# Patient Record
Sex: FEMALE | Race: WHITE | NOT HISPANIC OR LATINO | Employment: UNEMPLOYED | URBAN - METROPOLITAN AREA
[De-identification: names, ages, dates, MRNs, and addresses within clinical notes are randomized per-mention and may not be internally consistent; named-entity substitution may affect disease eponyms.]

---

## 2017-05-02 ENCOUNTER — ALLSCRIPTS OFFICE VISIT (OUTPATIENT)
Dept: OTHER | Facility: OTHER | Age: 67
End: 2017-05-02

## 2017-05-02 DIAGNOSIS — N18.30 CHRONIC KIDNEY DISEASE, STAGE III (MODERATE) (HCC): ICD-10-CM

## 2017-05-02 LAB
BILIRUB UR QL STRIP: 1
CLARITY UR: NORMAL
COLOR UR: YELLOW
GLUCOSE (HISTORICAL): NEGATIVE
HGB UR QL STRIP.AUTO: NEGATIVE
KETONES UR STRIP-MCNC: NEGATIVE MG/DL
LEUKOCYTE ESTERASE UR QL STRIP: NEGATIVE
NITRITE UR QL STRIP: NEGATIVE
PH UR STRIP.AUTO: 5 [PH]
PROT UR STRIP-MCNC: 1 MG/DL
SP GR UR STRIP.AUTO: 1.03
UROBILINOGEN UR QL STRIP.AUTO: 0.2

## 2017-05-09 ENCOUNTER — HOSPITAL ENCOUNTER (OUTPATIENT)
Dept: RADIOLOGY | Facility: HOSPITAL | Age: 67
Discharge: HOME | End: 2017-05-09
Attending: INTERNAL MEDICINE
Payer: COMMERCIAL

## 2017-05-09 DIAGNOSIS — N18.30 CHRONIC KIDNEY DISEASE, STAGE III (MODERATE) (HCC): ICD-10-CM

## 2017-05-09 PROCEDURE — 76770 US EXAM ABDO BACK WALL COMP: CPT

## 2017-06-05 DIAGNOSIS — N18.30 CHRONIC KIDNEY DISEASE, STAGE III (MODERATE) (HCC): ICD-10-CM

## 2017-06-05 DIAGNOSIS — M10.9 GOUT: ICD-10-CM

## 2017-06-05 DIAGNOSIS — R80.9 PROTEINURIA: ICD-10-CM

## 2017-08-08 ENCOUNTER — ALLSCRIPTS OFFICE VISIT (OUTPATIENT)
Dept: OTHER | Facility: OTHER | Age: 67
End: 2017-08-08

## 2017-08-08 DIAGNOSIS — N25.81 SECONDARY HYPERPARATHYROIDISM OF RENAL ORIGIN (HCC): ICD-10-CM

## 2017-08-16 ENCOUNTER — GENERIC CONVERSION - ENCOUNTER (OUTPATIENT)
Dept: OTHER | Facility: OTHER | Age: 67
End: 2017-08-16

## 2017-08-21 DIAGNOSIS — I10 ESSENTIAL (PRIMARY) HYPERTENSION: ICD-10-CM

## 2017-10-08 DIAGNOSIS — I10 ESSENTIAL (PRIMARY) HYPERTENSION: ICD-10-CM

## 2017-11-30 ENCOUNTER — GENERIC CONVERSION - ENCOUNTER (OUTPATIENT)
Dept: NEPHROLOGY | Facility: CLINIC | Age: 67
End: 2017-11-30

## 2017-11-30 ENCOUNTER — GENERIC CONVERSION - ENCOUNTER (OUTPATIENT)
Dept: OTHER | Facility: OTHER | Age: 67
End: 2017-11-30

## 2018-01-13 VITALS
DIASTOLIC BLOOD PRESSURE: 82 MMHG | WEIGHT: 242.13 LBS | SYSTOLIC BLOOD PRESSURE: 160 MMHG | HEIGHT: 65 IN | BODY MASS INDEX: 40.34 KG/M2 | HEART RATE: 76 BPM

## 2018-01-13 NOTE — MISCELLANEOUS
Message  Unable to reach patient regarding BP readings  BP has been 908G systolic at home with diastolic in 27J on average  Will try again to speak with her after bloodwork next week (already ordered and patient aware as ordered during last office visit)        Signatures   Electronically signed by : Papi Vela DO; Aug 16 2017  1:45PM EST                       (Author)

## 2018-01-15 VITALS
BODY MASS INDEX: 38.86 KG/M2 | HEART RATE: 69 BPM | WEIGHT: 233.25 LBS | SYSTOLIC BLOOD PRESSURE: 174 MMHG | HEIGHT: 65 IN | DIASTOLIC BLOOD PRESSURE: 72 MMHG

## 2018-01-15 DIAGNOSIS — N18.30 CHRONIC KIDNEY DISEASE, STAGE III (MODERATE) (HCC): ICD-10-CM

## 2018-01-22 VITALS — DIASTOLIC BLOOD PRESSURE: 80 MMHG | SYSTOLIC BLOOD PRESSURE: 160 MMHG

## 2018-01-22 VITALS
HEIGHT: 65 IN | WEIGHT: 230.38 LBS | DIASTOLIC BLOOD PRESSURE: 68 MMHG | BODY MASS INDEX: 38.38 KG/M2 | SYSTOLIC BLOOD PRESSURE: 140 MMHG

## 2018-03-28 ENCOUNTER — OFFICE VISIT (OUTPATIENT)
Dept: NEPHROLOGY | Facility: CLINIC | Age: 68
End: 2018-03-28
Payer: COMMERCIAL

## 2018-03-28 VITALS
SYSTOLIC BLOOD PRESSURE: 136 MMHG | HEIGHT: 65 IN | HEART RATE: 62 BPM | WEIGHT: 233.8 LBS | DIASTOLIC BLOOD PRESSURE: 72 MMHG | BODY MASS INDEX: 38.95 KG/M2

## 2018-03-28 DIAGNOSIS — I10 BENIGN ESSENTIAL HYPERTENSION: ICD-10-CM

## 2018-03-28 DIAGNOSIS — M10.9 GOUT, UNSPECIFIED CAUSE, UNSPECIFIED CHRONICITY, UNSPECIFIED SITE: ICD-10-CM

## 2018-03-28 DIAGNOSIS — N25.81 SECONDARY HYPERPARATHYROIDISM OF RENAL ORIGIN (HCC): ICD-10-CM

## 2018-03-28 DIAGNOSIS — R80.9 PROTEINURIA, UNSPECIFIED TYPE: ICD-10-CM

## 2018-03-28 DIAGNOSIS — N18.30 STAGE III CHRONIC KIDNEY DISEASE (HCC): Primary | ICD-10-CM

## 2018-03-28 PROBLEM — E11.9 DIABETES (HCC): Status: ACTIVE | Noted: 2017-05-02

## 2018-03-28 PROCEDURE — 3066F NEPHROPATHY DOC TX: CPT | Performed by: INTERNAL MEDICINE

## 2018-03-28 PROCEDURE — 99214 OFFICE O/P EST MOD 30 MIN: CPT | Performed by: INTERNAL MEDICINE

## 2018-03-28 RX ORDER — LORAZEPAM 0.5 MG/1
1 TABLET ORAL DAILY PRN
COMMUNITY
Start: 2017-05-02

## 2018-03-28 RX ORDER — PROPRANOLOL HYDROCHLORIDE 80 MG/1
80 TABLET ORAL DAILY
COMMUNITY
Start: 2017-05-02

## 2018-03-28 RX ORDER — VALSARTAN 80 MG/1
1 TABLET ORAL DAILY
COMMUNITY
Start: 2017-05-02 | End: 2019-06-21 | Stop reason: ALTCHOICE

## 2018-03-28 RX ORDER — FAMOTIDINE 20 MG/1
1 TABLET, FILM COATED ORAL DAILY PRN
COMMUNITY
Start: 2017-05-02

## 2018-03-28 NOTE — PROGRESS NOTES
NEPHROLOGY OUTPATIENT PROGRESS NOTE   Alhaji Laughlin 76 y o  female MRN: [id number]  DATE: 3/28/2018  Reason for visit:   Chief Complaint   Patient presents with    Follow-up        Patient Instructions   1  CKD stage 3b A1 likely DM +/- HTN - baseline 1 4-1 5 but has risen to 1 34 as of latest bloodwork    -needs adequate BP and BS control to slow progression of CKD  -renal u/s shows mild right sided cortical atrophy  No stones  -UA shows +protein without blood but is minimal on UpCr consistent with HTN nephrosclerosis as cause of CKD  -repeat formal urinalysis bland (no blood/protein)  -continue to avoid NSAIDs(aleve, ibuprofen, naproxen, advil, motrin) as this can worsen kidney disease  -repeat BMP next month     2  HTN - well controlled today  -Off dyazide since late March 2017 due to rise in sCr   -on diovan 80mg onice daily in the afternoon, continue inderal 40mg twice daily     3  proteinuria - minimal, UpCr 0 15, likely d/t HTN, already on diovan which will help with this     4  gout - uric acid improved  ? if truly gout now  No recent attacks     5  GERD - continue daily pepcid       6  secondary hyperparathyroidism - PTH bit high but no need for binders now, monitor PTH/phos  -Last   -note, not anemic per prior labs     7  DM2, diet controlled - better controlled, A1C 5 9          Axel Argueta was seen today for follow-up  Diagnoses and all orders for this visit:    Stage III chronic kidney disease  -     Basic metabolic panel; Future    Benign essential hypertension    Proteinuria, unspecified type  -     Protein / creatinine ratio, urine; Future    Secondary hyperparathyroidism of renal origin ([facility] [address] )  -     Phosphorus; Future  -     PTH, intact; Future    Gout, unspecified cause, unspecified chronicity, unspecified site        Assessment/Plan:  1   CKD stage 3b A1 likely DM +/- HTN - baseline 1 4-1 5 but has risen to 1 34 as of latest bloodwork    -needs adequate BP and BS control to slow progression of CKD  -renal u/s shows mild right sided cortical atrophy  No stones  -UA shows +protein without blood but is minimal on UpCr consistent with HTN nephrosclerosis as cause of CKD  -repeat formal urinalysis bland (no blood/protein)  -continue to avoid NSAIDs(aleve, ibuprofen, naproxen, advil, motrin) as this can worsen kidney disease  -repeat BMP next month     2  HTN - well controlled today  -Off dyazide since late March 2017 due to rise in sCr   -on diovan 80mg onice daily in the afternoon, continue inderal 40mg twice daily     3  proteinuria - minimal, UpCr 0 15, likely d/t HTN, already on diovan which will help with this     4  gout - uric acid improved  ? if truly gout now  No recent attacks     5  GERD - continue daily pepcid       6  secondary hyperparathyroidism - PTH bit high but no need for binders now, monitor PTH/phos  -Last   -note, not anemic per prior labs    7  DM2, diet controlled - better controlled, A1C 5 9      SUBJECTIVE / INTERVAL HISTORY:  76 y o  female presents in follow up of CKD  [place] denies any recent illness/hospitalizations/medication changes since last office visit  Denies NSAID use  BP - has been well controlled over the past few months  Gout - no recent flares  Has to take pepcid or tums for acid reflux which is worse at night  Review of Systems   Constitutional: Negative for chills and fever  HENT: Negative for sore throat and trouble swallowing  Eyes: Negative for visual disturbance  Respiratory: Negative for cough and shortness of breath  Cardiovascular: Negative for chest pain and leg swelling  Gastrointestinal: Negative for abdominal pain, diarrhea, nausea and vomiting  Endocrine: Negative for polyuria  Genitourinary: Negative for difficulty urinating, dysuria and hematuria  Musculoskeletal: Negative for back pain and neck pain  Skin: Negative for rash     Neurological: Negative for dizziness, light-headedness and numbness  Hematological: Negative for adenopathy  Does not bruise/bleed easily  Psychiatric/Behavioral: The patient is not nervous/anxious  OBJECTIVE:  /72 (BP Location: Right arm, Patient Position: Sitting, Cuff Size: Large)   Pulse 62   Ht 5' 5" (1 651 m)   Wt 106 kg (233 lb 12 8 oz)   BMI 38 91 kg/m²  Body mass index is 38 91 kg/m²  Physical exam:  Physical Exam   Constitutional: She appears well-developed and well-nourished  No distress  HENT:   Head: Normocephalic and atraumatic  Mouth/Throat: No oropharyngeal exudate  Eyes: Right eye exhibits no discharge  Left eye exhibits no discharge  No scleral icterus  Neck: Normal range of motion  Neck supple  No thyromegaly present  Cardiovascular: Normal rate and regular rhythm  No murmur heard  Pulmonary/Chest: Effort normal and breath sounds normal  No respiratory distress  She has no wheezes  Abdominal: Soft  Bowel sounds are normal  She exhibits no distension  Musculoskeletal: She exhibits no edema  Neurological: She is alert  She exhibits normal muscle tone  awake   Skin: Skin is warm and dry  No rash noted  She is not diaphoretic  Psychiatric: She has a normal mood and affect  Her behavior is normal    Nursing note and vitals reviewed  Medications:    Current Outpatient Prescriptions:     famotidine (PEPCID) 20 mg tablet, Take 1 tablet by mouth daily as needed  , Disp: , Rfl:     LORazepam (ATIVAN) 0 5 mg tablet, Take 1 tablet by mouth daily as needed, Disp: , Rfl:     propranolol (INDERAL) 40 mg tablet, Take 2 tablets by mouth 2 (two) times a day, Disp: , Rfl:     valsartan (DIOVAN) 80 mg tablet, Take 1 tablet by mouth daily, Disp: , Rfl:     Allergies:   Allergies as of 03/28/2018    (Not on File)       The following portions of the patient's history were reviewed and updated as appropriate: past family history, past surgical history and problem list     Laboratory Results:  sNa 139, K 4 2, , CO2 24, arun 9 8, gluc 95, BUN 22, sCr 1 71, eGFR 31 ml/m as of labs performed 11/14/17  Other labs in [facility]  sCr 1 34 from January 19, 2018  Portions of the record may have been created with voice recognition software   Occasional wrong word or "sound a like" substitutions may have occurred due to the inherent limitations of voice recognition software   Read the chart carefully and recognize, using context, where substitutions have occurred

## 2018-03-28 NOTE — LETTER
March 28, 2018     Gordon Villegas MD  [address]    Patient: Ghislaine Diego   YOB: 1950   Date of Visit: 3/28/2018       Dear Dr Tracy Schwartz:    Thank you for referring Ghislaine Diego to me for evaluation  Below are my notes for this consultation  If you have questions, please do not hesitate to call me  I look forward to following your patient along with you  Sincerely,        Leobardo Graves DO        CC: No Recipients  Leobardo Graves DO  3/28/2018  8:38 AM  Sign at close encounter  [address] 76 y o  female MRN: [id number]  DATE: 3/28/2018  Reason for visit:   Chief Complaint   Patient presents with    Follow-up        Patient Instructions   1  CKD stage 3b A1 likely DM +/- HTN - baseline 1 4-1 5 but has risen to 1 34 as of latest bloodwork    -needs adequate BP and BS control to slow progression of CKD  -renal u/s shows mild right sided cortical atrophy  No stones  -UA shows +protein without blood but is minimal on UpCr consistent with HTN nephrosclerosis as cause of CKD  -repeat formal urinalysis bland (no blood/protein)  -continue to avoid NSAIDs(aleve, ibuprofen, naproxen, advil, motrin) as this can worsen kidney disease  -repeat BMP next month     2  HTN - well controlled today  -Off dyazide since late March 2017 due to rise in sCr   -on diovan 80mg onice daily in the afternoon, continue inderal 40mg twice daily     3  proteinuria - minimal, UpCr 0 15, likely d/t HTN, already on diovan which will help with this     4  gout - uric acid improved  ? if truly gout now  No recent attacks     5  GERD - continue daily pepcid       6  secondary hyperparathyroidism - PTH bit high but no need for binders now, monitor PTH/phos  -Last   -note, not anemic per prior labs     7  DM2, diet controlled - better controlled, A1C 5 9          Cherise Tobar was seen today for follow-up      Diagnoses and all orders for this visit:    Stage III chronic kidney disease  -     Basic metabolic panel; Future    Benign essential hypertension    Proteinuria, unspecified type  -     Protein / creatinine ratio, urine; Future    Secondary hyperparathyroidism of renal origin ([facility] [address] )  -     Phosphorus; Future  -     PTH, intact; Future    Gout, unspecified cause, unspecified chronicity, unspecified site        Assessment/Plan:  1  CKD stage 3b A1 likely DM +/- HTN - baseline 1 4-1 5 but has risen to 1 34 as of latest bloodwork    -needs adequate BP and BS control to slow progression of CKD  -renal u/s shows mild right sided cortical atrophy  No stones  -UA shows +protein without blood but is minimal on UpCr consistent with HTN nephrosclerosis as cause of CKD  -repeat formal urinalysis bland (no blood/protein)  -continue to avoid NSAIDs(aleve, ibuprofen, naproxen, advil, motrin) as this can worsen kidney disease  -repeat BMP next month     2  HTN - well controlled today  -Off dyazide since late March 2017 due to rise in sCr   -on diovan 80mg onice daily in the afternoon, continue inderal 40mg twice daily     3  proteinuria - minimal, UpCr 0 15, likely d/t HTN, already on diovan which will help with this     4  gout - uric acid improved  ? if truly gout now  No recent attacks     5  GERD - continue daily pepcid       6  secondary hyperparathyroidism - PTH bit high but no need for binders now, monitor PTH/phos  -Last   -note, not anemic per prior labs    7  DM2, diet controlled - better controlled, A1C 5 9      SUBJECTIVE / INTERVAL HISTORY:  76 y o  female presents in follow up of CKD  [employer] denies any recent illness/hospitalizations/medication changes since last office visit  Denies NSAID use  BP - has been well controlled over the past few months  Gout - no recent flares  Has to take pepcid or tums for acid reflux which is worse at night  Review of Systems   Constitutional: Negative for chills and fever     HENT: Negative for sore throat and trouble swallowing  Eyes: Negative for visual disturbance  Respiratory: Negative for cough and shortness of breath  Cardiovascular: Negative for chest pain and leg swelling  Gastrointestinal: Negative for abdominal pain, diarrhea, nausea and vomiting  Endocrine: Negative for polyuria  Genitourinary: Negative for difficulty urinating, dysuria and hematuria  Musculoskeletal: Negative for back pain and neck pain  Skin: Negative for rash  Neurological: Negative for dizziness, light-headedness and numbness  Hematological: Negative for adenopathy  Does not bruise/bleed easily  Psychiatric/Behavioral: The patient is not nervous/anxious  OBJECTIVE:  /72 (BP Location: Right arm, Patient Position: Sitting, Cuff Size: Large)   Pulse 62   Ht 5' 5" (1 651 m)   Wt 106 kg (233 lb 12 8 oz)   BMI 38 91 kg/m²   Body mass index is 38 91 kg/m²  Physical exam:  Physical Exam   Constitutional: She appears well-developed and well-nourished  No distress  HENT:   Head: Normocephalic and atraumatic  Mouth/Throat: No oropharyngeal exudate  Eyes: Right eye exhibits no discharge  Left eye exhibits no discharge  No scleral icterus  Neck: Normal range of motion  Neck supple  No thyromegaly present  Cardiovascular: Normal rate and regular rhythm  No murmur heard  Pulmonary/Chest: Effort normal and breath sounds normal  No respiratory distress  She has no wheezes  Abdominal: Soft  Bowel sounds are normal  She exhibits no distension  Musculoskeletal: She exhibits no edema  Neurological: She is alert  She exhibits normal muscle tone  awake   Skin: Skin is warm and dry  No rash noted  She is not diaphoretic  Psychiatric: She has a normal mood and affect  Her behavior is normal    Nursing note and vitals reviewed        Medications:    Current Outpatient Prescriptions:     famotidine (PEPCID) 20 mg tablet, Take 1 tablet by mouth daily as needed  , Disp: , Rfl:     LORazepam (ATIVAN) 0 5 mg tablet, Take 1 tablet by mouth daily as needed, Disp: , Rfl:     propranolol (INDERAL) 40 mg tablet, Take 2 tablets by mouth 2 (two) times a day, Disp: , Rfl:     valsartan (DIOVAN) 80 mg tablet, Take 1 tablet by mouth daily, Disp: , Rfl:     Allergies: Allergies as of 03/28/2018    (Not on File)       The following portions of the patient's history were reviewed and updated as appropriate: past family history, past surgical history and problem list     Laboratory Results:  sNa 139, K 4 2, , CO2 24, arun 9 8, gluc 95, BUN 22, sCr 1 71, eGFR 31 ml/m as of labs performed 11/14/17  Other labs in [facility]  sCr 1 34 from January 19, 2018  Portions of the record may have been created with voice recognition software   Occasional wrong word or "sound a like" substitutions may have occurred due to the inherent limitations of voice recognition software   Read the chart carefully and recognize, using context, where substitutions have occurred

## 2018-03-28 NOTE — PATIENT INSTRUCTIONS
1  CKD stage 3b A1 likely DM +/- HTN - baseline 1 4-1 5 but has risen to 1 34 as of latest bloodwork    -needs adequate BP and BS control to slow progression of CKD  -renal u/s shows mild right sided cortical atrophy  No stones  -UA shows +protein without blood but is minimal on UpCr consistent with HTN nephrosclerosis as cause of CKD  -repeat formal urinalysis bland (no blood/protein)  -continue to avoid NSAIDs(aleve, ibuprofen, naproxen, advil, motrin) as this can worsen kidney disease  -repeat BMP next month     2  HTN - well controlled today  -Off dyazide since late March 2017 due to rise in sCr   -on diovan 80mg onice daily in the afternoon, continue inderal 40mg twice daily     3  proteinuria - minimal, UpCr 0 15, likely d/t HTN, already on diovan which will help with this     4  gout - uric acid improved  ? if truly gout now  No recent attacks     5  GERD - continue daily pepcid       6  secondary hyperparathyroidism - PTH bit high but no need for binders now, monitor PTH/phos  -Last   -note, not anemic per prior labs     7   DM2, diet controlled - better controlled, A1C 5 9

## 2018-06-26 LAB — HBA1C MFR BLD HPLC: 5.9 %

## 2018-07-27 ENCOUNTER — OFFICE VISIT (OUTPATIENT)
Dept: NEPHROLOGY | Facility: CLINIC | Age: 68
End: 2018-07-27
Payer: COMMERCIAL

## 2018-07-27 VITALS
SYSTOLIC BLOOD PRESSURE: 183 MMHG | DIASTOLIC BLOOD PRESSURE: 77 MMHG | WEIGHT: 234.2 LBS | HEART RATE: 61 BPM | HEIGHT: 65 IN | BODY MASS INDEX: 39.02 KG/M2

## 2018-07-27 DIAGNOSIS — N18.30 STAGE III CHRONIC KIDNEY DISEASE (HCC): Primary | ICD-10-CM

## 2018-07-27 DIAGNOSIS — M10.9 GOUT, UNSPECIFIED CAUSE, UNSPECIFIED CHRONICITY, UNSPECIFIED SITE: ICD-10-CM

## 2018-07-27 DIAGNOSIS — N25.81 SECONDARY HYPERPARATHYROIDISM OF RENAL ORIGIN (HCC): ICD-10-CM

## 2018-07-27 DIAGNOSIS — I10 BENIGN ESSENTIAL HYPERTENSION: ICD-10-CM

## 2018-07-27 DIAGNOSIS — R80.9 PROTEINURIA, UNSPECIFIED TYPE: ICD-10-CM

## 2018-07-27 PROCEDURE — 99214 OFFICE O/P EST MOD 30 MIN: CPT | Performed by: INTERNAL MEDICINE

## 2018-07-27 RX ORDER — VALSARTAN 80 MG/1
80 TABLET ORAL DAILY
Refills: 0 | OUTPATIENT
Start: 2018-07-27

## 2018-07-27 RX ORDER — HYDROCHLOROTHIAZIDE 12.5 MG/1
12.5 TABLET ORAL DAILY
Qty: 30 TABLET | Refills: 1 | Status: SHIPPED | OUTPATIENT
Start: 2018-07-27 | End: 2019-01-04 | Stop reason: SDUPTHER

## 2018-07-27 NOTE — LETTER
July 27, 2018     Suzie Levin MD  [address]    Patient: Angel Hutchinson   YOB: 1950   Date of Visit: 7/27/2018       Dear Dr Megan Flores:    Thank you for referring Angel Hutchinson to me for evaluation  Below are my notes for this consultation  If you have questions, please do not hesitate to call me  I look forward to following your patient along with you  Sincerely,        Maddi Patches, DO        CC: No Recipients  Maddi Patches, DO  7/27/2018  8:11 AM  Sign at close encounter  [address] 76 y o  female MRN: [id number]  DATE: 7/27/2018  Reason for visit:   Chief Complaint   Patient presents with    Follow-up        Patient Instructions   1  CKD stage 3b A1 likely DM +/- HTN - baseline 1 4-1 5 but has risen to 1 35 as of latest bloodwork 6/26/18    -needs adequate BP and BS control to slow progression of CKD  -renal u/s shows mild right sided cortical atrophy  No stones  -UA shows +protein without blood but is minimal on UpCr consistent with HTN nephrosclerosis as cause of CKD  -repeat formal urinalysis bland (no blood/protein)  -continue to avoid NSAIDs(aleve, ibuprofen, naproxen, advil, motrin) as this can worsen kidney disease  -repeat BMP in 2 weeks and then again in October 2018       2  HTN - will begin hydrochlorothiazide every other day 12 5mg daily    -Off dyazide since late March 2017 due to rise in sCr  Will need to monitor Cr closely  -on diovan 80mg onice daily in the afternoon, continue inderal LA 80mg daily     3  proteinuria - minimal, UpCr 0 15, likely d/t HTN, already on diovan     4  gout - uric acid improved  No recent flares  Will monitor      5  GERD - continue daily pepcid       6  secondary hyperparathyroidism - PTH bit high but no need for binders now  -Last PTH 90 8  -note, not anemic per prior labs     7   DM2, diet controlled - better controlled, A1C 5 9 and stable  Bradly Ferreira was seen today for follow-up  Diagnoses and all orders for this visit:    Stage III chronic kidney disease  -     Basic metabolic panel; Future  -     Basic metabolic panel; Future    Secondary hyperparathyroidism of renal origin (HCC)    Proteinuria, unspecified type    Gout, unspecified cause, unspecified chronicity, unspecified site    Benign essential hypertension        Assessment/Plan:  1  CKD stage 3b A1 likely DM +/- HTN - baseline 1 4-1 5 but has risen to 1 35 as of latest bloodwork 6/26/18    -needs adequate BP and BS control to slow progression of CKD  -renal u/s shows mild right sided cortical atrophy  No stones  -UA shows +protein without blood but is minimal on UpCr consistent with HTN nephrosclerosis as cause of CKD  -repeat formal urinalysis bland (no blood/protein)  -continue to avoid NSAIDs(aleve, ibuprofen, naproxen, advil, motrin) as this can worsen kidney disease  -repeat BMP in 2 weeks and then again in October 2018     2  HTN - will begin hydrochlorothiazide every other day 12 5mg daily    -Off dyazide since late March 2017 due to rise in sCr  Will need to monitor Cr closely  -on diovan 80mg onice daily in the afternoon, continue inderal LA 80mg daily     3  proteinuria - minimal, UpCr 0 15, likely d/t HTN, already on diovan     4  gout - uric acid improved  No recent flares  Will monitor      5  GERD - continue daily pepcid       6  secondary hyperparathyroidism - PTH bit high but no need for binders now  -Last PTH 90 8  -note, not anemic per prior labs     7  DM2, diet controlled - better controlled, A1C 5 9 and stable  SUBJECTIVE / INTERVAL HISTORY:  76 y o  female presents in follow up of CKD  [place] denies any recent illness/hospitalizations/medication changes since last office visit  Denies NSAID use  Uses tylenol prn for pain  BP - was controlled in 120s   She is on lumigan eye drops for increased IOP and she is stressed with her mother being demented and caring for her but she notices her BP has been running higher  Gout - uric acid 6 7, no recent flares  DM2 - has very good control  Sugars 105-130 fasting  Nausea with Meniere's disease  Heartburn has gotten worse  She has had her valsartan original brand switched to [place]  Review of Systems   Constitutional: Negative for chills and fever  HENT: Negative for sore throat and trouble swallowing  Eyes: Positive for visual disturbance (blurred at times, on lumigan)  Respiratory: Negative for cough and shortness of breath  Cardiovascular: Positive for palpitations  Negative for chest pain and leg swelling  Gastrointestinal: Positive for nausea  Negative for abdominal pain, constipation, diarrhea and vomiting  Endocrine: Negative for polyuria  Genitourinary: Negative for difficulty urinating, dysuria and hematuria  Musculoskeletal: Positive for back pain ("sciatica")  Negative for neck pain  Skin: Positive for rash (psoriasis flares at times)  Neurological: Positive for dizziness (with Meniere's episodes) and headaches (with Meniere's episodes)  Negative for light-headedness and numbness  Hematological: Negative for adenopathy  Does not bruise/bleed easily  Psychiatric/Behavioral: The patient is not nervous/anxious  Stressed       OBJECTIVE:  /88 (BP Location: Left arm, Patient Position: Sitting, Cuff Size: Large)   Pulse 64   Ht 5' 5" (1 651 m)   Wt 106 kg (234 lb 3 2 oz)   BMI 38 97 kg/m²   Body mass index is 38 97 kg/m²  Physical exam:  Physical Exam   Constitutional: She appears well-developed and well-nourished  No distress  HENT:   Head: Normocephalic and atraumatic  Mouth/Throat: No oropharyngeal exudate  Eyes: Right eye exhibits no discharge  Left eye exhibits no discharge  No scleral icterus  Neck: Normal range of motion  Neck supple  No thyromegaly present  Cardiovascular: Normal rate and regular rhythm      No murmur heard   Pulmonary/Chest: Effort normal and breath sounds normal  No respiratory distress  She has no wheezes  Abdominal: Soft  Bowel sounds are normal  She exhibits no distension  Musculoskeletal: She exhibits no edema  Neurological: She is alert  She exhibits normal muscle tone  awake   Skin: Skin is warm and dry  No rash noted  She is not diaphoretic  Psychiatric: She has a normal mood and affect  Her behavior is normal    Nursing note and vitals reviewed  Medications:    Current Outpatient Prescriptions:     famotidine (PEPCID) 20 mg tablet, Take 1 tablet by mouth daily as needed  , Disp: , Rfl:     LORazepam (ATIVAN) 0 5 mg tablet, Take 1 tablet by mouth daily as needed, Disp: , Rfl:     propranolol (INDERAL) 40 mg tablet, Take 2 tablets by mouth 2 (two) times a day, Disp: , Rfl:     valsartan (DIOVAN) 80 mg tablet, Take 1 tablet by mouth daily, Disp: , Rfl:     Allergies: Allergies as of 07/27/2018 - Reviewed 03/28/2018   Allergen Reaction Noted    Codeine Other (See Comments)     Erythromycin  05/02/2017    Miconazole  05/02/2017       The following portions of the patient's history were reviewed and updated as appropriate: past family history, past surgical history and problem list     Laboratory Results:  No results found for: GLUCOSE, CALCIUM, NA, K, CO2, CL, BUN, CREATININE     No results found for: PTH, CALCIUM, CAION, PHOS    Portions of the record may have been created with voice recognition software   Occasional wrong word or "sound a like" substitutions may have occurred due to the inherent limitations of voice recognition software   Read the chart carefully and recognize, using context, where substitutions have occurred

## 2018-07-27 NOTE — PROGRESS NOTES
NEPHROLOGY OUTPATIENT PROGRESS NOTE   Karen Bateman 76 y o  female MRN: [id number]  DATE: 7/27/2018  Reason for visit:   Chief Complaint   Patient presents with    Follow-up        Patient Instructions   1  CKD stage 3b A1 likely DM +/- HTN - baseline 1 4-1 5 but has risen to 1 35 as of latest bloodwork 6/26/18    -needs adequate BP and BS control to slow progression of CKD  -renal u/s shows mild right sided cortical atrophy  No stones  -UA shows +protein without blood but is minimal on UpCr consistent with HTN nephrosclerosis as cause of CKD  -repeat formal urinalysis bland (no blood/protein)  -continue to avoid NSAIDs(aleve, ibuprofen, naproxen, advil, motrin) as this can worsen kidney disease  -repeat BMP in 2 weeks and then again in October 2018       2  HTN - will begin hydrochlorothiazide every other day 12 5mg daily    -Off dyazide since late March 2017 due to rise in sCr  Will need to monitor Cr closely  -on diovan 80mg onice daily in the afternoon, continue inderal LA 80mg daily     3  proteinuria - minimal, UpCr 0 15, likely d/t HTN, already on diovan     4  gout - uric acid improved  No recent flares  Will monitor      5  GERD - continue daily pepcid       6  secondary hyperparathyroidism - PTH bit high but no need for binders now  -Last PTH 90 8  -note, not anemic per prior labs     7  DM2, diet controlled - better controlled, A1C 5 9 and stable  Bradly Ferreira was seen today for follow-up  Diagnoses and all orders for this visit:    Stage III chronic kidney disease  -     Basic metabolic panel; Future  -     Basic metabolic panel; Future    Secondary hyperparathyroidism of renal origin (HCC)    Proteinuria, unspecified type    Gout, unspecified cause, unspecified chronicity, unspecified site    Benign essential hypertension        Assessment/Plan:  1   CKD stage 3b A1 likely DM +/- HTN - baseline 1 4-1 5 but has risen to 1 35 as of latest bloodwork 6/26/18    -needs adequate BP and BS control to slow progression of CKD  -renal u/s shows mild right sided cortical atrophy  No stones  -UA shows +protein without blood but is minimal on UpCr consistent with HTN nephrosclerosis as cause of CKD  -repeat formal urinalysis bland (no blood/protein)  -continue to avoid NSAIDs(aleve, ibuprofen, naproxen, advil, motrin) as this can worsen kidney disease  -repeat BMP in 2 weeks and then again in October 2018     2  HTN - will begin hydrochlorothiazide every other day 12 5mg daily    -Off dyazide since late March 2017 due to rise in sCr  Will need to monitor Cr closely  -on diovan 80mg onice daily in the afternoon, continue inderal LA 80mg daily     3  proteinuria - minimal, UpCr 0 15, likely d/t HTN, already on diovan     4  gout - uric acid improved  No recent flares  Will monitor      5  GERD - continue daily pepcid       6  secondary hyperparathyroidism - PTH bit high but no need for binders now  -Last PTH 90 8  -note, not anemic per prior labs     7  DM2, diet controlled - better controlled, A1C 5 9 and stable  SUBJECTIVE / INTERVAL HISTORY:  76 y o  female presents in follow up of CKD  Nolon Flatness denies any recent illness/hospitalizations/medication changes since last office visit  Denies NSAID use  Uses tylenol prn for pain  BP - was controlled in 120s  She is on lumigan eye drops for increased IOP and she is stressed with her mother being demented and caring for her but she notices her BP has been running higher  Gout - uric acid 6 7, no recent flares  DM2 - has very good control  Sugars 105-130 fasting  Nausea with Meniere's disease  Heartburn has gotten worse  She has had her valsartan original brand switched to [place]  Review of Systems   Constitutional: Negative for chills and fever  HENT: Negative for sore throat and trouble swallowing  Eyes: Positive for visual disturbance (blurred at times, on lumigan)  Respiratory: Negative for cough and shortness of breath  Cardiovascular: Positive for palpitations  Negative for chest pain and leg swelling  Gastrointestinal: Positive for nausea  Negative for abdominal pain, constipation, diarrhea and vomiting  Endocrine: Negative for polyuria  Genitourinary: Negative for difficulty urinating, dysuria and hematuria  Musculoskeletal: Positive for back pain ("sciatica")  Negative for neck pain  Skin: Positive for rash (psoriasis flares at times)  Neurological: Positive for dizziness (with Meniere's episodes) and headaches (with Meniere's episodes)  Negative for light-headedness and numbness  Hematological: Negative for adenopathy  Does not bruise/bleed easily  Psychiatric/Behavioral: The patient is not nervous/anxious  Stressed       OBJECTIVE:  /88 (BP Location: Left arm, Patient Position: Sitting, Cuff Size: Large)   Pulse 64   Ht 5' 5" (1 651 m)   Wt 106 kg (234 lb 3 2 oz)   BMI 38 97 kg/m²  Body mass index is 38 97 kg/m²  Physical exam:  Physical Exam   Constitutional: She appears well-developed and well-nourished  No distress  HENT:   Head: Normocephalic and atraumatic  Mouth/Throat: No oropharyngeal exudate  Eyes: Right eye exhibits no discharge  Left eye exhibits no discharge  No scleral icterus  Neck: Normal range of motion  Neck supple  No thyromegaly present  Cardiovascular: Normal rate and regular rhythm  No murmur heard  Pulmonary/Chest: Effort normal and breath sounds normal  No respiratory distress  She has no wheezes  Abdominal: Soft  Bowel sounds are normal  She exhibits no distension  Musculoskeletal: She exhibits no edema  Neurological: She is alert  She exhibits normal muscle tone  awake   Skin: Skin is warm and dry  No rash noted  She is not diaphoretic  Psychiatric: She has a normal mood and affect  Her behavior is normal    Nursing note and vitals reviewed        Medications:    Current Outpatient Prescriptions:     famotidine (PEPCID) 20 mg tablet, Take 1 tablet by mouth daily as needed  , Disp: , Rfl:     LORazepam (ATIVAN) 0 5 mg tablet, Take 1 tablet by mouth daily as needed, Disp: , Rfl:     propranolol (INDERAL) 40 mg tablet, Take 2 tablets by mouth 2 (two) times a day, Disp: , Rfl:     valsartan (DIOVAN) 80 mg tablet, Take 1 tablet by mouth daily, Disp: , Rfl:     Allergies: Allergies as of 07/27/2018 - Reviewed 03/28/2018   Allergen Reaction Noted    Codeine Other (See Comments)     Erythromycin  05/02/2017    Miconazole  05/02/2017       The following portions of the patient's history were reviewed and updated as appropriate: past family history, past surgical history and problem list     Laboratory Results:  No results found for: GLUCOSE, CALCIUM, NA, K, CO2, CL, BUN, CREATININE     No results found for: PTH, CALCIUM, CAION, PHOS    Portions of the record may have been created with voice recognition software   Occasional wrong word or "sound a like" substitutions may have occurred due to the inherent limitations of voice recognition software   Read the chart carefully and recognize, using context, where substitutions have occurred

## 2018-07-27 NOTE — PATIENT INSTRUCTIONS
1  CKD stage 3b A1 likely DM +/- HTN - baseline 1 4-1 5 but has risen to 1 35 as of latest bloodwork 6/26/18    -needs adequate BP and BS control to slow progression of CKD  -renal u/s shows mild right sided cortical atrophy  No stones  -UA shows +protein without blood but is minimal on UpCr consistent with HTN nephrosclerosis as cause of CKD  -repeat formal urinalysis bland (no blood/protein)  -continue to avoid NSAIDs(aleve, ibuprofen, naproxen, advil, motrin) as this can worsen kidney disease  -repeat BMP in 2 weeks and then again in October 2018       2  HTN - will begin hydrochlorothiazide every other day 12 5mg daily    -Off dyazide since late March 2017 due to rise in sCr  Will need to monitor Cr closely  -on diovan 80mg onice daily in the afternoon, continue inderal LA 80mg daily     3  proteinuria - minimal, UpCr 0 15, likely d/t HTN, already on diovan     4  gout - uric acid improved  No recent flares  Will monitor      5  GERD - continue daily pepcid       6  secondary hyperparathyroidism - PTH bit high but no need for binders now  -Last PTH 90 8  -note, not anemic per prior labs     7  DM2, diet controlled - better controlled, A1C 5 9 and stable

## 2018-08-16 ENCOUNTER — TELEPHONE (OUTPATIENT)
Dept: NEPHROLOGY | Facility: HOSPITAL | Age: 68
End: 2018-08-16

## 2018-08-16 NOTE — TELEPHONE ENCOUNTER
"She may continue to take HCTZ every other day as the sCr is still within her baseline and it appears her BP is well controlled on this regimen   Thanks " (Routing comment)

## 2018-08-16 NOTE — TELEPHONE ENCOUNTER
----- Message from Serene Johnson sent at 8/16/2018  3:51 PM EDT -----  Regarding: Non-Urgent Medical Question  Contact: [phone]  Dr Juliana Nunez,    I am wondering if I should continue taking the HCTZ based on the test results of August 15, 2018  The creatinine level increased to 1 46 from 1 35  I have been taking the 12 5 HCTZ every other day since July 28  My blood pressure has been around 135/85  Thank you for your help    Serene Johnson

## 2018-08-17 NOTE — TELEPHONE ENCOUNTER
I left a message for Remy Salgado letting her know that she would need to get a BMP in October  I left our office # in case she had any questions

## 2018-08-17 NOTE — TELEPHONE ENCOUNTER
Prakash An called back  She will continue with HCTZ every other day per Dr Hackett Gear  She has a lab slip for October, would you like for her to get labs at that time?

## 2018-11-20 ENCOUNTER — TELEPHONE (OUTPATIENT)
Dept: NEPHROLOGY | Facility: CLINIC | Age: 68
End: 2018-11-20

## 2018-11-20 NOTE — TELEPHONE ENCOUNTER
Called and scheduled patient for her January follow up  Patient is scheduled for January 4th at 730 am  Patient is aware of time, date and location

## 2019-01-04 ENCOUNTER — OFFICE VISIT (OUTPATIENT)
Dept: NEPHROLOGY | Facility: CLINIC | Age: 69
End: 2019-01-04
Payer: COMMERCIAL

## 2019-01-04 VITALS — BODY MASS INDEX: 40.14 KG/M2 | WEIGHT: 241.2 LBS | SYSTOLIC BLOOD PRESSURE: 160 MMHG | DIASTOLIC BLOOD PRESSURE: 78 MMHG

## 2019-01-04 DIAGNOSIS — N18.30 STAGE III CHRONIC KIDNEY DISEASE (HCC): Primary | ICD-10-CM

## 2019-01-04 DIAGNOSIS — I10 BENIGN ESSENTIAL HYPERTENSION: ICD-10-CM

## 2019-01-04 DIAGNOSIS — M10.9 GOUT, UNSPECIFIED CAUSE, UNSPECIFIED CHRONICITY, UNSPECIFIED SITE: ICD-10-CM

## 2019-01-04 DIAGNOSIS — R80.9 PROTEINURIA, UNSPECIFIED TYPE: ICD-10-CM

## 2019-01-04 DIAGNOSIS — N25.81 SECONDARY HYPERPARATHYROIDISM OF RENAL ORIGIN (HCC): ICD-10-CM

## 2019-01-04 PROCEDURE — 99214 OFFICE O/P EST MOD 30 MIN: CPT | Performed by: INTERNAL MEDICINE

## 2019-01-04 PROCEDURE — 3066F NEPHROPATHY DOC TX: CPT | Performed by: INTERNAL MEDICINE

## 2019-01-04 RX ORDER — HYDROCHLOROTHIAZIDE 12.5 MG/1
12.5 TABLET ORAL DAILY
Qty: 30 TABLET | Refills: 0 | Status: SHIPPED | OUTPATIENT
Start: 2019-01-04 | End: 2019-02-02 | Stop reason: SDUPTHER

## 2019-01-04 NOTE — LETTER
January 4, 2019     Crispin Evans MD  [address] [facility]  [address] [employer]    Patient: Shital Monte   YOB: 1950   Date of Visit: 1/4/2019       Dear Dr Harley Galvez:    Thank you for referring Shital Monte to me for evaluation  Below are my notes for this consultation  If you have questions, please do not hesitate to call me  I look forward to following your patient along with you  Sincerely,        Pam Bateman DO        CC: No Recipients  Pam Bateman DO  1/4/2019  8:02 AM  Sign at close encounter  [address] 76 y o  female MRN: [id number]  DATE: 1/4/2019  Reason for visit:   Chief Complaint   Patient presents with    Follow-up        Patient Instructions   1  CKD stage 3b A1 likely DM +/- HTN - baseline 1 4-1 5 but has risen to 1 41 as of latest bloodwork  -needs adequate BP and BS control to slow progression of CKD  -renal u/s shows mild right sided cortical atrophy  No stones  -UA shows +protein without blood but is minimal on UpCr consistent with HTN nephrosclerosis as cause of CKD  -repeat formal urinalysis bland (no blood/protein)  -continue to avoid NSAIDs(aleve, ibuprofen, naproxen, advil, motrin) as this can worsen kidney disease  -repeat BMP in April 2019     2  HTN - BP elevated in office, likely stress induced  -Off dyazide since late March 2017 due to rise in sCr  Will need to monitor Cr closely  -on diovan 80mg onice daily in the afternoon, continue inderal LA 80mg daily, on hydrochlorothiazide 12 5mg every other day but will change to everyday     3  proteinuria - minimal, UpCr 0 15, likely d/t HTN, already on diovan  Repeat urine protein:Cr     4  gout - uric acid improved  No recent flares  Will monitor      5  GERD - continue daily pepcid       6  secondary hyperparathyroidism - PTH bit high but no need for binders now  -Last PTH 90 8, monitor PTH/phos/mag  -note, not anemic per prior labs     7   DM2, diet controlled - better controlled, A1C 5 9 and stable  RTC in June 2019  Axel Argueta was seen today for follow-up  Diagnoses and all orders for this visit:    Stage III chronic kidney disease ([facility] [address] )  -     Basic metabolic panel; Future  -     Magnesium; Future  -     Phosphorus; Future    Secondary hyperparathyroidism of renal origin ([facility] )  -     PTH, intact; Future  -     Magnesium; Future  -     Phosphorus; Future    Benign essential hypertension  -     hydrochlorothiazide (HYDRODIURIL) 12 5 mg tablet; Take 1 tablet (12 5 mg total) by mouth daily Take 1 tablet every day    Gout, unspecified cause, unspecified chronicity, unspecified site    Proteinuria, unspecified type  -     Protein / creatinine ratio, urine; Future        Assessment/Plan:  1  CKD stage 3b A1 likely DM +/- HTN - baseline 1 4-1 5 but has risen to 1 41 as of latest bloodwork  -needs adequate BP and BS control to slow progression of CKD  -renal u/s shows mild right sided cortical atrophy  No stones  -UA shows +protein without blood but is minimal on UpCr consistent with HTN nephrosclerosis as cause of CKD  -repeat formal urinalysis bland (no blood/protein)  -continue to avoid NSAIDs(aleve, ibuprofen, naproxen, advil, motrin) as this can worsen kidney disease  -repeat BMP in April 2019     2  HTN - BP elevated in office, likely stress induced  -Off dyazide since late March 2017 due to rise in sCr  Will need to monitor Cr closely  -on diovan 80mg onice daily in the afternoon, continue inderal LA 80mg daily, on hydrochlorothiazide 12 5mg every other day but will change to everyday     3  proteinuria - minimal, UpCr 0 15, likely d/t HTN, already on diovan  Repeat urine protein:Cr     4  gout - uric acid improved  No recent flares  Will monitor      5  GERD - continue daily pepcid       6  secondary hyperparathyroidism - PTH bit high but no need for binders now  -Last PTH 90 8, monitor PTH/phos/mag  -note, not anemic per prior labs     7   DM2, diet controlled - better controlled, A1C 5 9 and stable  SUBJECTIVE / INTERVAL HISTORY:  76 y o  female presents in follow up of CKD  Raad Villafana denies any recent illness/hospitalizations/medication changes since last office visit  Denies NSAID use  Had taken a bit more tylenol for her root canal and back  BP a bit high today  BP at home runs a bit higher but has stress from her mother in law having fecal incontinence  Blood sugars well controlled  Has had dietary noncompliance on vacation  Gout - no recent flares  -does take pepcid for acid reflux    Review of Systems   Constitutional: Negative for chills and fever  HENT: Negative for sore throat and trouble swallowing  Eyes: Negative for visual disturbance  Respiratory: Negative for cough and shortness of breath  Cardiovascular: Negative for chest pain and leg swelling  Gastrointestinal: Negative for abdominal pain, constipation, diarrhea, nausea and vomiting  Endocrine: Negative for polyuria  Genitourinary: Negative for difficulty urinating, dysuria and hematuria  Musculoskeletal: Positive for back pain  Negative for neck pain  Skin: Negative for rash  Neurological: Negative for dizziness, light-headedness and numbness  Hematological: Negative for adenopathy  Does not bruise/bleed easily  Psychiatric/Behavioral: The patient is not nervous/anxious  OBJECTIVE:  /78 (BP Location: Left arm, Patient Position: Sitting, Cuff Size: Large)   Wt 109 kg (241 lb 3 2 oz)   BMI 40 14 kg/m²   Body mass index is 40 14 kg/m²  Physical exam:  Physical Exam   Constitutional: She appears well-developed and well-nourished  No distress  HENT:   Head: Normocephalic and atraumatic  Mouth/Throat: No oropharyngeal exudate  Eyes: Right eye exhibits no discharge  Left eye exhibits no discharge  No scleral icterus  Neck: Normal range of motion  Neck supple  No thyromegaly present     Cardiovascular: Normal rate and regular rhythm  No murmur heard  Pulmonary/Chest: Effort normal and breath sounds normal  No respiratory distress  She has no wheezes  Abdominal: Soft  Bowel sounds are normal  She exhibits no distension  Musculoskeletal: She exhibits no edema  Neurological: She is alert  She exhibits normal muscle tone  awake   Skin: Skin is warm and dry  No rash noted  She is not diaphoretic  Psychiatric: She has a normal mood and affect  Her behavior is normal    Nursing note and vitals reviewed  Medications:    Current Outpatient Prescriptions:     bimatoprost (LUMIGAN) 0 01 % ophthalmic drops, Administer 1 drop to both eyes daily at bedtime, Disp: , Rfl:     famotidine (PEPCID) 20 mg tablet, Take 1 tablet by mouth daily as needed  , Disp: , Rfl:     hydrochlorothiazide (HYDRODIURIL) 12 5 mg tablet, Take 1 tablet (12 5 mg total) by mouth daily Take 1 tablet every other day for HTN, Disp: 30 tablet, Rfl: 1    LORazepam (ATIVAN) 0 5 mg tablet, Take 1 tablet by mouth daily as needed, Disp: , Rfl:     propranolol (INDERAL) 40 mg tablet, Take 80 mg by mouth daily  , Disp: , Rfl:     valsartan (DIOVAN) 80 mg tablet, Take 1 tablet by mouth daily, Disp: , Rfl:     Allergies:   Allergies as of 01/04/2019 - Reviewed 01/04/2019   Allergen Reaction Noted    Codeine Other (See Comments)     Erythromycin  05/02/2017    Miconazole  05/02/2017       The following portions of the patient's history were reviewed and updated as appropriate: past family history, past surgical history and problem list     Laboratory Results:  No results found for: GLUCOSE, CALCIUM, NA, K, CO2, CL, BUN, CREATININE     No results found for: PTH, CALCIUM, CAION, PHOS    Portions of the record may have been created with voice recognition software   Occasional wrong word or "sound a like" substitutions may have occurred due to the inherent limitations of voice recognition software   Read the chart carefully and recognize, using context, where substitutions have occurred

## 2019-01-04 NOTE — PATIENT INSTRUCTIONS
1  CKD stage 3b A1 likely DM +/- HTN - baseline 1 4-1 5 but has risen to 1 41 as of latest bloodwork  -needs adequate BP and BS control to slow progression of CKD  -renal u/s shows mild right sided cortical atrophy  No stones  -UA shows +protein without blood but is minimal on UpCr consistent with HTN nephrosclerosis as cause of CKD  -repeat formal urinalysis bland (no blood/protein)  -continue to avoid NSAIDs(aleve, ibuprofen, naproxen, advil, motrin) as this can worsen kidney disease  -repeat BMP in April 2019     2  HTN - BP elevated in office, likely stress induced  -Off dyazide since late March 2017 due to rise in sCr  Will need to monitor Cr closely  -on diovan 80mg onice daily in the afternoon, continue inderal LA 80mg daily, on hydrochlorothiazide 12 5mg every other day but will change to everyday     3  proteinuria - minimal, UpCr 0 15, likely d/t HTN, already on diovan  Repeat urine protein:Cr     4  gout - uric acid improved  No recent flares  Will monitor      5  GERD - continue daily pepcid       6  secondary hyperparathyroidism - PTH bit high but no need for binders now  -Last PTH 90 8, monitor PTH/phos/mag  -note, not anemic per prior labs     7  DM2, diet controlled - better controlled, A1C 5 9 and stable  RTC in June 2019

## 2019-01-04 NOTE — PROGRESS NOTES
NEPHROLOGY OUTPATIENT PROGRESS NOTE   Gauri Magaña 76 y o  female MRN: [id number]  DATE: 1/4/2019  Reason for visit:   Chief Complaint   Patient presents with    Follow-up        Patient Instructions   1  CKD stage 3b A1 likely DM +/- HTN - baseline 1 4-1 5 but has risen to 1 41 as of latest bloodwork  -needs adequate BP and BS control to slow progression of CKD  -renal u/s shows mild right sided cortical atrophy  No stones  -UA shows +protein without blood but is minimal on UpCr consistent with HTN nephrosclerosis as cause of CKD  -repeat formal urinalysis bland (no blood/protein)  -continue to avoid NSAIDs(aleve, ibuprofen, naproxen, advil, motrin) as this can worsen kidney disease  -repeat BMP in April 2019     2  HTN - BP elevated in office, likely stress induced  -Off dyazide since late March 2017 due to rise in sCr  Will need to monitor Cr closely  -on diovan 80mg onice daily in the afternoon, continue inderal LA 80mg daily, on hydrochlorothiazide 12 5mg every other day but will change to everyday     3  proteinuria - minimal, UpCr 0 15, likely d/t HTN, already on diovan  Repeat urine protein:Cr     4  gout - uric acid improved  No recent flares  Will monitor      5  GERD - continue daily pepcid       6  secondary hyperparathyroidism - PTH bit high but no need for binders now  -Last PTH 90 8, monitor PTH/phos/mag  -note, not anemic per prior labs     7  DM2, diet controlled - better controlled, A1C 5 9 and stable  RTC in June 2019  Roxana Demarco was seen today for follow-up  Diagnoses and all orders for this visit:    Stage III chronic kidney disease ([address] )  -     Basic metabolic panel; Future  -     Magnesium; Future  -     Phosphorus; Future    Secondary hyperparathyroidism of renal origin ([address] )  -     PTH, intact; Future  -     Magnesium; Future  -     Phosphorus; Future    Benign essential hypertension  -     hydrochlorothiazide (HYDRODIURIL) 12 5 mg tablet;  Take 1 tablet (12 5 mg total) by mouth daily Take 1 tablet every day    Gout, unspecified cause, unspecified chronicity, unspecified site    Proteinuria, unspecified type  -     Protein / creatinine ratio, urine; Future        Assessment/Plan:  1  CKD stage 3b A1 likely DM +/- HTN - baseline 1 4-1 5 but has risen to 1 41 as of latest bloodwork  -needs adequate BP and BS control to slow progression of CKD  -renal u/s shows mild right sided cortical atrophy  No stones  -UA shows +protein without blood but is minimal on UpCr consistent with HTN nephrosclerosis as cause of CKD  -repeat formal urinalysis bland (no blood/protein)  -continue to avoid NSAIDs(aleve, ibuprofen, naproxen, advil, motrin) as this can worsen kidney disease  -repeat BMP in April 2019     2  HTN - BP elevated in office, likely stress induced  -Off dyazide since late March 2017 due to rise in sCr  Will need to monitor Cr closely  -on diovan 80mg onice daily in the afternoon, continue inderal LA 80mg daily, on hydrochlorothiazide 12 5mg every other day but will change to everyday     3  proteinuria - minimal, UpCr 0 15, likely d/t HTN, already on diovan  Repeat urine protein:Cr     4  gout - uric acid improved  No recent flares  Will monitor      5  GERD - continue daily pepcid       6  secondary hyperparathyroidism - PTH bit high but no need for binders now  -Last PTH 90 8, monitor PTH/phos/mag  -note, not anemic per prior labs     7  DM2, diet controlled - better controlled, A1C 5 9 and stable  SUBJECTIVE / INTERVAL HISTORY:  76 y o  female presents in follow up of CKD  Aichacarlos eduardo Zamarripaa denies any recent illness/hospitalizations/medication changes since last office visit  Denies NSAID use  Had taken a bit more tylenol for her root canal and back  BP a bit high today  BP at home runs a bit higher but has stress from her mother in law having fecal incontinence  Blood sugars well controlled  Has had dietary noncompliance on vacation     Gout - no recent flares  -does take pepcid for acid reflux    Review of Systems   Constitutional: Negative for chills and fever  HENT: Negative for sore throat and trouble swallowing  Eyes: Negative for visual disturbance  Respiratory: Negative for cough and shortness of breath  Cardiovascular: Negative for chest pain and leg swelling  Gastrointestinal: Negative for abdominal pain, constipation, diarrhea, nausea and vomiting  Endocrine: Negative for polyuria  Genitourinary: Negative for difficulty urinating, dysuria and hematuria  Musculoskeletal: Positive for back pain  Negative for neck pain  Skin: Negative for rash  Neurological: Negative for dizziness, light-headedness and numbness  Hematological: Negative for adenopathy  Does not bruise/bleed easily  Psychiatric/Behavioral: The patient is not nervous/anxious  OBJECTIVE:  /78 (BP Location: Left arm, Patient Position: Sitting, Cuff Size: Large)   Wt 109 kg (241 lb 3 2 oz)   BMI 40 14 kg/m²  Body mass index is 40 14 kg/m²  Physical exam:  Physical Exam   Constitutional: She appears well-developed and well-nourished  No distress  HENT:   Head: Normocephalic and atraumatic  Mouth/Throat: No oropharyngeal exudate  Eyes: Right eye exhibits no discharge  Left eye exhibits no discharge  No scleral icterus  Neck: Normal range of motion  Neck supple  No thyromegaly present  Cardiovascular: Normal rate and regular rhythm  No murmur heard  Pulmonary/Chest: Effort normal and breath sounds normal  No respiratory distress  She has no wheezes  Abdominal: Soft  Bowel sounds are normal  She exhibits no distension  Musculoskeletal: She exhibits no edema  Neurological: She is alert  She exhibits normal muscle tone  awake   Skin: Skin is warm and dry  No rash noted  She is not diaphoretic  Psychiatric: She has a normal mood and affect  Her behavior is normal    Nursing note and vitals reviewed        Medications:    Current Outpatient Prescriptions:     bimatoprost (LUMIGAN) 0 01 % ophthalmic drops, Administer 1 drop to both eyes daily at bedtime, Disp: , Rfl:     famotidine (PEPCID) 20 mg tablet, Take 1 tablet by mouth daily as needed  , Disp: , Rfl:     hydrochlorothiazide (HYDRODIURIL) 12 5 mg tablet, Take 1 tablet (12 5 mg total) by mouth daily Take 1 tablet every other day for HTN, Disp: 30 tablet, Rfl: 1    LORazepam (ATIVAN) 0 5 mg tablet, Take 1 tablet by mouth daily as needed, Disp: , Rfl:     propranolol (INDERAL) 40 mg tablet, Take 80 mg by mouth daily  , Disp: , Rfl:     valsartan (DIOVAN) 80 mg tablet, Take 1 tablet by mouth daily, Disp: , Rfl:     Allergies: Allergies as of 01/04/2019 - Reviewed 01/04/2019   Allergen Reaction Noted    Codeine Other (See Comments)     Erythromycin  05/02/2017    Miconazole  05/02/2017       The following portions of the patient's history were reviewed and updated as appropriate: past family history, past surgical history and problem list     Laboratory Results:  No results found for: GLUCOSE, CALCIUM, NA, K, CO2, CL, BUN, CREATININE     No results found for: PTH, CALCIUM, CAION, PHOS    Portions of the record may have been created with voice recognition software   Occasional wrong word or "sound a like" substitutions may have occurred due to the inherent limitations of voice recognition software   Read the chart carefully and recognize, using context, where substitutions have occurred

## 2019-02-02 DIAGNOSIS — I10 BENIGN ESSENTIAL HYPERTENSION: ICD-10-CM

## 2019-02-02 RX ORDER — HYDROCHLOROTHIAZIDE 12.5 MG/1
TABLET ORAL
Qty: 30 TABLET | Refills: 0 | Status: SHIPPED | OUTPATIENT
Start: 2019-02-02 | End: 2019-03-01 | Stop reason: SDUPTHER

## 2019-03-01 DIAGNOSIS — I10 BENIGN ESSENTIAL HYPERTENSION: ICD-10-CM

## 2019-03-01 RX ORDER — HYDROCHLOROTHIAZIDE 12.5 MG/1
TABLET ORAL
Qty: 30 TABLET | Refills: 0 | Status: SHIPPED | OUTPATIENT
Start: 2019-03-01 | End: 2019-04-03 | Stop reason: SDUPTHER

## 2019-03-20 ENCOUNTER — TELEPHONE (OUTPATIENT)
Dept: NEPHROLOGY | Facility: CLINIC | Age: 69
End: 2019-03-20

## 2019-03-25 NOTE — TELEPHONE ENCOUNTER
Pt returned phone call in regards to scheduling her may follow up apt  unfortunately she needs a Friday morning apt  She wont be home for the last two weeks of may  Is it okay that I switched her to be on the June recall list  Patient wanted to strictly see you

## 2019-04-01 LAB
CREAT ?TM UR-SCNC: 229 UMOL/L
EXT PROTEIN URINE: 21
PROT/CREAT UR: 0.09 MG/G{CREAT}

## 2019-04-02 ENCOUNTER — TELEPHONE (OUTPATIENT)
Dept: NEPHROLOGY | Facility: CLINIC | Age: 69
End: 2019-04-02

## 2019-04-02 DIAGNOSIS — E87.6 HYPOKALEMIA: Primary | ICD-10-CM

## 2019-04-02 DIAGNOSIS — E83.41 HYPERMAGNESEMIA: ICD-10-CM

## 2019-04-03 DIAGNOSIS — I10 BENIGN ESSENTIAL HYPERTENSION: ICD-10-CM

## 2019-04-03 RX ORDER — HYDROCHLOROTHIAZIDE 12.5 MG/1
TABLET ORAL
Qty: 30 TABLET | Refills: 0 | Status: SHIPPED | OUTPATIENT
Start: 2019-04-03 | End: 2019-06-21 | Stop reason: SDUPTHER

## 2019-04-11 DIAGNOSIS — E55.9 VITAMIN D DEFICIENCY: Primary | ICD-10-CM

## 2019-04-11 RX ORDER — ERGOCALCIFEROL (VITAMIN D2) 1250 MCG
50000 CAPSULE ORAL WEEKLY
Qty: 12 CAPSULE | Refills: 0 | Status: SHIPPED | OUTPATIENT
Start: 2019-04-11 | End: 2020-11-25 | Stop reason: ALTCHOICE

## 2019-06-05 LAB — HBA1C MFR BLD HPLC: 5.8 %

## 2019-06-21 ENCOUNTER — OFFICE VISIT (OUTPATIENT)
Dept: NEPHROLOGY | Facility: CLINIC | Age: 69
End: 2019-06-21
Payer: COMMERCIAL

## 2019-06-21 VITALS
BODY MASS INDEX: 35.99 KG/M2 | HEIGHT: 65 IN | WEIGHT: 216 LBS | SYSTOLIC BLOOD PRESSURE: 132 MMHG | RESPIRATION RATE: 18 BRPM | HEART RATE: 57 BPM | DIASTOLIC BLOOD PRESSURE: 74 MMHG

## 2019-06-21 DIAGNOSIS — R80.9 PROTEINURIA, UNSPECIFIED TYPE: ICD-10-CM

## 2019-06-21 DIAGNOSIS — M10.9 GOUT, UNSPECIFIED CAUSE, UNSPECIFIED CHRONICITY, UNSPECIFIED SITE: ICD-10-CM

## 2019-06-21 DIAGNOSIS — N18.30 STAGE III CHRONIC KIDNEY DISEASE (HCC): Primary | ICD-10-CM

## 2019-06-21 DIAGNOSIS — N25.81 SECONDARY HYPERPARATHYROIDISM OF RENAL ORIGIN (HCC): ICD-10-CM

## 2019-06-21 DIAGNOSIS — I10 BENIGN ESSENTIAL HYPERTENSION: ICD-10-CM

## 2019-06-21 PROCEDURE — 3066F NEPHROPATHY DOC TX: CPT | Performed by: INTERNAL MEDICINE

## 2019-06-21 PROCEDURE — 99213 OFFICE O/P EST LOW 20 MIN: CPT | Performed by: INTERNAL MEDICINE

## 2019-06-24 RX ORDER — HYDROCHLOROTHIAZIDE 12.5 MG/1
TABLET ORAL
Qty: 30 TABLET | Refills: 0 | Status: SHIPPED | OUTPATIENT
Start: 2019-06-24 | End: 2020-11-25 | Stop reason: ALTCHOICE

## 2019-07-01 DIAGNOSIS — E55.9 VITAMIN D DEFICIENCY: ICD-10-CM

## 2019-07-01 RX ORDER — ERGOCALCIFEROL 1.25 MG/1
CAPSULE ORAL
Qty: 12 CAPSULE | Refills: 0 | OUTPATIENT
Start: 2019-07-01

## 2020-11-25 ENCOUNTER — OFFICE VISIT (OUTPATIENT)
Dept: NEPHROLOGY | Facility: CLINIC | Age: 70
End: 2020-11-25
Payer: COMMERCIAL

## 2020-11-25 VITALS
DIASTOLIC BLOOD PRESSURE: 78 MMHG | BODY MASS INDEX: 35.32 KG/M2 | HEIGHT: 65 IN | WEIGHT: 212 LBS | SYSTOLIC BLOOD PRESSURE: 136 MMHG | HEART RATE: 60 BPM | TEMPERATURE: 98.4 F

## 2020-11-25 DIAGNOSIS — R80.9 PROTEINURIA, UNSPECIFIED TYPE: ICD-10-CM

## 2020-11-25 DIAGNOSIS — N18.32 STAGE 3B CHRONIC KIDNEY DISEASE (HCC): Primary | ICD-10-CM

## 2020-11-25 DIAGNOSIS — M10.9 GOUT, UNSPECIFIED CAUSE, UNSPECIFIED CHRONICITY, UNSPECIFIED SITE: ICD-10-CM

## 2020-11-25 DIAGNOSIS — N25.81 SECONDARY HYPERPARATHYROIDISM OF RENAL ORIGIN (HCC): ICD-10-CM

## 2020-11-25 DIAGNOSIS — I10 BENIGN ESSENTIAL HYPERTENSION: ICD-10-CM

## 2020-11-25 PROCEDURE — 99214 OFFICE O/P EST MOD 30 MIN: CPT | Performed by: INTERNAL MEDICINE

## 2020-11-25 RX ORDER — MELATONIN
2000 DAILY
Start: 2020-11-25

## 2020-11-25 RX ORDER — LANOLIN ALCOHOL/MO/W.PET/CERES
1000 CREAM (GRAM) TOPICAL DAILY
Start: 2020-11-25

## 2020-11-25 RX ORDER — AMLODIPINE BESYLATE 5 MG/1
5 TABLET ORAL DAILY
COMMUNITY
End: 2022-07-19 | Stop reason: ALTCHOICE

## 2021-03-04 DIAGNOSIS — Z23 ENCOUNTER FOR IMMUNIZATION: ICD-10-CM

## 2021-03-09 ENCOUNTER — IMMUNIZATIONS (OUTPATIENT)
Dept: FAMILY MEDICINE CLINIC | Facility: HOSPITAL | Age: 71
End: 2021-03-09

## 2021-03-09 DIAGNOSIS — Z23 ENCOUNTER FOR IMMUNIZATION: Primary | ICD-10-CM

## 2021-03-09 PROCEDURE — 0001A SARS-COV-2 / COVID-19 MRNA VACCINE (PFIZER-BIONTECH) 30 MCG: CPT

## 2021-03-09 PROCEDURE — 91300 SARS-COV-2 / COVID-19 MRNA VACCINE (PFIZER-BIONTECH) 30 MCG: CPT

## 2021-03-23 ENCOUNTER — TELEPHONE (OUTPATIENT)
Dept: NEPHROLOGY | Facility: CLINIC | Age: 71
End: 2021-03-23

## 2021-03-23 NOTE — TELEPHONE ENCOUNTER
I called and left a detail message asking patient to contact the office to schedule the follow up appointment with Dr Abdi or one of the AP's  Called patient from the recall list  Please schedule when patient returns the call back

## 2021-03-30 ENCOUNTER — IMMUNIZATIONS (OUTPATIENT)
Dept: FAMILY MEDICINE CLINIC | Facility: HOSPITAL | Age: 71
End: 2021-03-30

## 2021-03-30 DIAGNOSIS — Z23 ENCOUNTER FOR IMMUNIZATION: Primary | ICD-10-CM

## 2021-03-30 PROCEDURE — 91300 SARS-COV-2 / COVID-19 MRNA VACCINE (PFIZER-BIONTECH) 30 MCG: CPT

## 2021-03-30 PROCEDURE — 0002A SARS-COV-2 / COVID-19 MRNA VACCINE (PFIZER-BIONTECH) 30 MCG: CPT

## 2021-06-22 ENCOUNTER — OFFICE VISIT (OUTPATIENT)
Dept: NEPHROLOGY | Facility: CLINIC | Age: 71
End: 2021-06-22
Payer: COMMERCIAL

## 2021-06-22 VITALS
HEIGHT: 65 IN | HEART RATE: 65 BPM | SYSTOLIC BLOOD PRESSURE: 118 MMHG | BODY MASS INDEX: 33.99 KG/M2 | RESPIRATION RATE: 18 BRPM | DIASTOLIC BLOOD PRESSURE: 64 MMHG | WEIGHT: 204 LBS

## 2021-06-22 DIAGNOSIS — N25.81 SECONDARY HYPERPARATHYROIDISM OF RENAL ORIGIN (HCC): ICD-10-CM

## 2021-06-22 DIAGNOSIS — R80.9 PROTEINURIA, UNSPECIFIED TYPE: ICD-10-CM

## 2021-06-22 DIAGNOSIS — N18.32 STAGE 3B CHRONIC KIDNEY DISEASE (HCC): ICD-10-CM

## 2021-06-22 DIAGNOSIS — E11.22 TYPE 2 DIABETES MELLITUS WITH STAGE 3B CHRONIC KIDNEY DISEASE, WITHOUT LONG-TERM CURRENT USE OF INSULIN (HCC): Primary | ICD-10-CM

## 2021-06-22 DIAGNOSIS — M10.9 GOUT, UNSPECIFIED CAUSE, UNSPECIFIED CHRONICITY, UNSPECIFIED SITE: ICD-10-CM

## 2021-06-22 DIAGNOSIS — I10 BENIGN ESSENTIAL HYPERTENSION: ICD-10-CM

## 2021-06-22 DIAGNOSIS — N18.32 TYPE 2 DIABETES MELLITUS WITH STAGE 3B CHRONIC KIDNEY DISEASE, WITHOUT LONG-TERM CURRENT USE OF INSULIN (HCC): Primary | ICD-10-CM

## 2021-06-22 PROCEDURE — 3008F BODY MASS INDEX DOCD: CPT | Performed by: INTERNAL MEDICINE

## 2021-06-22 PROCEDURE — 99214 OFFICE O/P EST MOD 30 MIN: CPT | Performed by: INTERNAL MEDICINE

## 2021-06-22 PROCEDURE — 3066F NEPHROPATHY DOC TX: CPT | Performed by: INTERNAL MEDICINE

## 2021-06-22 PROCEDURE — 1160F RVW MEDS BY RX/DR IN RCRD: CPT | Performed by: INTERNAL MEDICINE

## 2021-06-22 PROCEDURE — 1036F TOBACCO NON-USER: CPT | Performed by: INTERNAL MEDICINE

## 2021-06-22 NOTE — PATIENT INSTRUCTIONS
1  CKD stage 3b A1 likely DM +/- HTN - baseline 1 4-1 5 with most recent sCr 1 29 as of 6/16/21, below baseline, eGFR 42  -needs adequate BP and BS control to slow progression of CKD  -renal u/s shows mild right sided cortical atrophy  No stones  -UA shows leukocyte esterase with few bacteria without nitrites as of 6/16/21, negative for protein  -continue to avoid NSAIDs(aleve, ibuprofen, naproxen, advil, motrin) as this can worsen kidney disease  -repeat BMP Sept 2021     2  HTN - BP well controlled for age and level of CKD  -Off dyazide since late March 2017 due to rise in sCr  Will need to monitor Cr closely  Also off diovan,   -Continue amlodipine 5 mg daily, inderal LA 80mg daily  -off HCTZ  -PRA 97, aldosterone 19 4, renin 0 2  -repeat aldosterone 23 2, repeat renin 0 3 - consider endo consultation if BP rises     3  proteinuria - minimal, UpCr 0 08 as of June 2021, likely d/t HTN  -monitor urine protein to creatinine ratio     4  gout - uric acid improved  No recent flares  Will monitor      5  GERD - on pepcid as needed     6  secondary hyperparathyroidism - PTH bit high but no need for binders now  -Last  6 as of April 2019, monitor PTH/phos/mag  -note, not anemic per prior labs, last Hgb 14 4     7  DM2, diet controlled - better controlled, A1C 5 6 as of 11/18/20 and stable      8  Vitamin D deficiency - completed ergocalciferol course  Now on vitamin D3 2000u daily, repeat 25(OH) vitamin D level 20 as of 11/25/20       9  Right kidney cyst - 1 4cm in size on CT abdomen from Nov 2020, previously 1cm in May 2017       RTC in 6 month  Obtain blood testing in Sept  and again in Dec  2021  Obtain A1C as soon as you are able

## 2021-06-22 NOTE — PROGRESS NOTES
NEPHROLOGY OUTPATIENT PROGRESS NOTE   Yuri Rae 70 y o  female MRN: [id number]  DATE: 6/22/2021  Reason for visit:   Chief Complaint   Patient presents with    Follow-up    Chronic Kidney Disease        Patient Instructions   1  CKD stage 3b A1 likely DM +/- HTN - baseline 1 4-1 5 with most recent sCr 1 29 as of 6/16/21, below baseline, eGFR 42  -needs adequate BP and BS control to slow progression of CKD  -renal u/s shows mild right sided cortical atrophy  No stones  -UA shows leukocyte esterase with few bacteria without nitrites as of 6/16/21, negative for protein  -continue to avoid NSAIDs(aleve, ibuprofen, naproxen, advil, motrin) as this can worsen kidney disease  -repeat BMP Sept 2021     2  HTN - BP well controlled for age and level of CKD  -Off dyazide since late March 2017 due to rise in sCr  Will need to monitor Cr closely  Also off diovan,   -Continue amlodipine 5 mg daily, inderal LA 80mg daily  -off HCTZ  -PRA 97, aldosterone 19 4, renin 0 2  -repeat aldosterone 23 2, repeat renin 0 3 - consider endo consultation if BP rises     3  proteinuria - minimal, UpCr 0 08 as of June 2021, likely d/t HTN  -monitor urine protein to creatinine ratio     4  gout - uric acid improved  No recent flares  Will monitor      5  GERD - on pepcid as needed     6  secondary hyperparathyroidism - PTH bit high but no need for binders now  -Last  6 as of April 2019, monitor PTH/phos/mag  -note, not anemic per prior labs, last Hgb 14 4     7  DM2, diet controlled - better controlled, A1C 5 6 as of 11/18/20 and stable      8  Vitamin D deficiency - completed ergocalciferol course  Now on vitamin D3 2000u daily, repeat 25(OH) vitamin D level 20 as of 11/25/20       9  Right kidney cyst - 1 4cm in size on CT abdomen from Nov 2020, previously 1cm in May 2017       RTC in 6 month  Obtain blood testing in Sept  and again in Dec  2021  Obtain A1C as soon as you are able          Denton Tony was seen today for follow-up and chronic kidney disease  Diagnoses and all orders for this visit:    Type 2 diabetes mellitus with stage 3b chronic kidney disease, without long-term current use of insulin ([facility])  -     Hemoglobin A1C; Future  -     Basic metabolic panel; Future  -     Urinalysis with microscopic; Future  -     Protein / creatinine ratio, urine; Future    Secondary hyperparathyroidism of renal origin ([facility] [address] )  -     PTH, intact; Future    Benign essential hypertension    Stage 3b chronic kidney disease ([facility])  -     Basic metabolic panel; Future    Gout, unspecified cause, unspecified chronicity, unspecified site    Proteinuria, unspecified type  -     Protein / creatinine ratio, urine; Future        Assessment/Plan:  1  CKD stage 3b A1 likely DM +/- HTN - baseline 1 4-1 5 with most recent sCr 1 29 as of 6/16/21, below baseline, eGFR 42  -needs adequate BP and BS control to slow progression of CKD  -renal u/s shows mild right sided cortical atrophy  No stones  -UA shows leukocyte esterase with few bacteria without nitrites as of 6/16/21, negative for protein  -continue to avoid NSAIDs(aleve, ibuprofen, naproxen, advil, motrin) as this can worsen kidney disease  -repeat BMP Sept 2021     2  HTN - BP well controlled for age and level of CKD  -Off dyazide since late March 2017 due to rise in sCr  Will need to monitor Cr closely  Also off diovan,   -Continue amlodipine 5 mg daily, inderal LA 80mg daily  -off HCTZ  -PRA 97, aldosterone 19 4, renin 0 2  -repeat aldosterone 23 2, repeat renin 0 3 - consider endo consultation if BP rises     3  proteinuria - minimal, UpCr 0 08 as of June 2021, likely d/t HTN  -monitor urine protein to creatinine ratio     4  gout - uric acid improved  No recent flares  Will monitor      5  GERD - on pepcid as needed     6  secondary hyperparathyroidism - PTH bit high but no need for binders now  -Last  6 as of April 2019, monitor PTH/phos/mag  -note, not anemic per prior labs, last Hgb 14 4     7  DM2, diet controlled - better controlled, A1C 5 6 as of 11/18/20 and stable      8  Vitamin D deficiency - completed ergocalciferol course  Now on vitamin D3 2000u daily, repeat 25(OH) vitamin D level 20 as of 11/25/20       9  Right kidney cyst - 1 4cm in size on CT abdomen from Nov 2020, previously 1cm in May 2017       RTC in 6 month  Obtain blood testing in Sept  and again in Dec  2021  Obtain A1C as soon as you are able  SUBJECTIVE / INTERVAL HISTORY:  70 y o  female presents in follow up of CKD  Raad [place] denies any recent illness/hospitalizations/medication changes since last office visit  Denies NSAID use  DM2 - blood sugars well controlled, range 100-105  HTN - BP at home 125-130/75  No recent gout flares  Review of Systems   Constitutional: Negative for chills and fever  HENT: Negative for sore throat and trouble swallowing  Eyes: Negative for visual disturbance  Respiratory: Positive for shortness of breath (occasional)  Negative for cough  Cardiovascular: Negative for chest pain and leg swelling  Gastrointestinal: Negative for abdominal pain, constipation, diarrhea, nausea and vomiting  Endocrine: Negative for polyuria  Genitourinary: Negative for difficulty urinating, dysuria and hematuria  Musculoskeletal: Negative for back pain and neck pain  Skin: Positive for rash (psoriasis)  Neurological: Positive for dizziness (from meniere's)  Negative for light-headedness and numbness  Hematological: Negative for adenopathy  Does not bruise/bleed easily  Psychiatric/Behavioral: The patient is not nervous/anxious  OBJECTIVE:  /64 (BP Location: Left arm, Patient Position: Sitting, Cuff Size: Large)   Pulse 65   Resp 18   Ht 5' 5" (1 651 m)   Wt 92 5 kg (204 lb)   BMI 33 95 kg/m²  Body mass index is 33 95 kg/m²  Physical exam:  Physical Exam  Vitals and nursing note reviewed  Constitutional:       General: She is not in acute distress  Appearance: Normal appearance  She is well-developed  She is not diaphoretic  HENT:      Head: Normocephalic and atraumatic  Nose: Nose normal       Mouth/Throat:      Mouth: Mucous membranes are moist       Pharynx: No oropharyngeal exudate  Eyes:      General: No scleral icterus  Right eye: No discharge  Left eye: No discharge  Neck:      Thyroid: No thyromegaly  Cardiovascular:      Rate and Rhythm: Normal rate and regular rhythm  Heart sounds: No murmur heard  Pulmonary:      Effort: Pulmonary effort is normal  No respiratory distress  Breath sounds: Normal breath sounds  No wheezing  Abdominal:      General: Bowel sounds are normal  There is no distension  Palpations: Abdomen is soft  Musculoskeletal:         General: No swelling  Normal range of motion  Cervical back: Normal range of motion and neck supple  Skin:     General: Skin is warm and dry  Findings: No rash  Neurological:      General: No focal deficit present  Mental Status: She is alert  Motor: No abnormal muscle tone        Comments: awake   Psychiatric:         Mood and Affect: Mood normal          Behavior: Behavior normal          Medications:    Current Outpatient Medications:     amLODIPine (NORVASC) 5 mg tablet, Take 5 mg by mouth daily, Disp: , Rfl:     bimatoprost (LUMIGAN) 0 01 % ophthalmic drops, Administer 1 drop to both eyes daily at bedtime, Disp: , Rfl:     cholecalciferol (VITAMIN D3) 1,000 units tablet, Take 2 tablets (2,000 Units total) by mouth daily, Disp: , Rfl:     famotidine (PEPCID) 20 mg tablet, Take 1 tablet by mouth daily as needed  , Disp: , Rfl:     LORazepam (ATIVAN) 0 5 mg tablet, Take 1 tablet by mouth daily as needed, Disp: , Rfl:     propranolol (INDERAL) 80 mg tablet, Take 80 mg by mouth daily , Disp: , Rfl:     vitamin B-12 (VITAMIN B-12) 1,000 mcg tablet, Take 1 tablet (1,000 mcg total) by mouth daily, Disp: , Rfl: Allergies: Allergies as of 06/22/2021 - Reviewed 06/22/2021   Allergen Reaction Noted    Codeine Other (See Comments)     Erythromycin  05/02/2017    Miconazole  05/02/2017       The following portions of the patient's history were reviewed and updated as appropriate: past family history, past surgical history and problem list     Laboratory Results:  No results found for: SODIUM, K, CL, CO2, BUN, CREATININE, GLUC, CALCIUM     No results found for: PTH, CALCIUM, CAION, PHOS    Portions of the record may have been created with voice recognition software   Occasional wrong word or "sound a like" substitutions may have occurred due to the inherent limitations of voice recognition software   Read the chart carefully and recognize, using context, where substitutions have occurred

## 2021-10-02 ENCOUNTER — IMMUNIZATIONS (OUTPATIENT)
Dept: FAMILY MEDICINE CLINIC | Facility: HOSPITAL | Age: 71
End: 2021-10-02

## 2021-10-02 DIAGNOSIS — Z23 ENCOUNTER FOR IMMUNIZATION: Primary | ICD-10-CM

## 2021-10-02 PROCEDURE — 0001A SARS-COV-2 / COVID-19 MRNA VACCINE (PFIZER-BIONTECH) 30 MCG: CPT

## 2021-10-02 PROCEDURE — 91300 SARS-COV-2 / COVID-19 MRNA VACCINE (PFIZER-BIONTECH) 30 MCG: CPT

## 2021-11-04 ENCOUNTER — TELEPHONE (OUTPATIENT)
Dept: NEPHROLOGY | Facility: CLINIC | Age: 71
End: 2021-11-04

## 2022-01-12 ENCOUNTER — TELEPHONE (OUTPATIENT)
Dept: NEPHROLOGY | Facility: CLINIC | Age: 72
End: 2022-01-12

## 2022-01-12 DIAGNOSIS — N18.32 STAGE 3B CHRONIC KIDNEY DISEASE (HCC): Primary | ICD-10-CM

## 2022-01-12 DIAGNOSIS — R80.9 PROTEINURIA, UNSPECIFIED TYPE: ICD-10-CM

## 2022-01-18 ENCOUNTER — DOCUMENTATION (OUTPATIENT)
Dept: NEPHROLOGY | Facility: CLINIC | Age: 72
End: 2022-01-18

## 2022-01-18 LAB
CAOX CRY URNS QL MICRO: ABNORMAL /HPF
CREAT ?TM UR-SCNC: 162 UMOL/L
EXT BLOOD, UA: ABNORMAL
EXT GLUCOSE BLD: 108
EXT GLUCOSE, UA: ABNORMAL
EXT KETONES: ABNORMAL
EXT NITRITE, UA: ABNORMAL
EXT PH, UA: 6
EXT PROTEIN URINE: 25.5
EXT PROTEIN, UA: ABNORMAL
EXT SPECIFIC GRAVITY, UA: 1.02
EXTERNAL BUN: 15
EXTERNAL CALCIUM: 9.1
EXTERNAL CHLORIDE: 112
EXTERNAL CO2: 24
EXTERNAL CREATININE: 1.22
EXTERNAL EGFR: 45
EXTERNAL POTASSIUM: 4.4
EXTERNAL PTH: 86.6
EXTERNAL RBC (UA): 0
EXTERNAL SODIUM: 143
EXTERNAL WBC (UA): ABNORMAL
MUCOUS THREADS URNS QL MICRO: ABNORMAL /HPF
PROT/CREAT UR: 0.16 MG/G{CREAT}
SQUAMOUS #/AREA URNS HPF: ABNORMAL /[HPF]
WBC # BLD EST: ABNORMAL 10*3/UL

## 2022-01-19 ENCOUNTER — OFFICE VISIT (OUTPATIENT)
Dept: NEPHROLOGY | Facility: CLINIC | Age: 72
End: 2022-01-19
Payer: COMMERCIAL

## 2022-01-19 VITALS
WEIGHT: 213.4 LBS | DIASTOLIC BLOOD PRESSURE: 70 MMHG | HEIGHT: 65 IN | SYSTOLIC BLOOD PRESSURE: 136 MMHG | BODY MASS INDEX: 35.56 KG/M2

## 2022-01-19 DIAGNOSIS — M10.9 GOUT, UNSPECIFIED CAUSE, UNSPECIFIED CHRONICITY, UNSPECIFIED SITE: ICD-10-CM

## 2022-01-19 DIAGNOSIS — I10 BENIGN ESSENTIAL HYPERTENSION: ICD-10-CM

## 2022-01-19 DIAGNOSIS — N25.81 SECONDARY HYPERPARATHYROIDISM OF RENAL ORIGIN (HCC): ICD-10-CM

## 2022-01-19 DIAGNOSIS — E11.22 TYPE 2 DIABETES MELLITUS WITH STAGE 3B CHRONIC KIDNEY DISEASE, WITHOUT LONG-TERM CURRENT USE OF INSULIN (HCC): Primary | ICD-10-CM

## 2022-01-19 DIAGNOSIS — R80.9 PROTEINURIA, UNSPECIFIED TYPE: ICD-10-CM

## 2022-01-19 DIAGNOSIS — N18.32 TYPE 2 DIABETES MELLITUS WITH STAGE 3B CHRONIC KIDNEY DISEASE, WITHOUT LONG-TERM CURRENT USE OF INSULIN (HCC): Primary | ICD-10-CM

## 2022-01-19 PROCEDURE — 99214 OFFICE O/P EST MOD 30 MIN: CPT | Performed by: INTERNAL MEDICINE

## 2022-01-19 RX ORDER — SIMVASTATIN 20 MG
20 TABLET ORAL
COMMUNITY

## 2022-01-19 NOTE — PATIENT INSTRUCTIONS
1  CKD stage 3a/b A1 likely DM +/- HTN - baseline 1 4-1 5 with most recent sCr 1 22 as of 1/17/22, below baseline, eGFR 45  -maintain adequate BP and BS control to slow progression of CKD  -UA with microscopy shows trace protein and trace leukocytes, 0-5 WBCs, occasional calcium oxalate crystals with UpCr negligible at 0 16g  -renal u/s shows mild right sided cortical atrophy  No stones  -UA shows leukocyte esterase with few bacteria without nitrites as of 6/16/21, negative for protein  -continue to avoid NSAIDs(aleve, ibuprofen, naproxen, advil, motrin) as this can worsen kidney disease  -repeat BMP prior to next appointment     2  HTN - BP well controlled for age and level of CKD  -Off dyazide since late March 2017 due to rise in sCr  Will need to monitor Cr closely  Also off diovan  -Continue amlodipine 5 mg daily, inderal LA 80mg daily  -off HCTZ  -PRA 97, aldosterone 19 4, renin 0 2  -repeat aldosterone 23 2, repeat renin 0 3 - consider endo consultation if BP rises     3  proteinuria - minimal, UpCr 0 16 as of Jan 2022, likely d/t HTN  -monitor urine protein to creatinine ratio     4  gout - uric acid improved  No recent flares  Will monitor      5  GERD - on pepcid as needed     6  secondary hyperparathyroidism - PTH bit high but no need for binders now  -Last  6-->86 6 as of Jan 2022, monitor PTH/phos/mag  -note, not anemic per prior labs, last Hgb 14 4     7  DM2, diet controlled - better controlled, A1C 5 6 as of June 2021 and stable      8  Vitamin D deficiency - completed ergocalciferol course  Now on vitamin D3 2000u daily, repeat 25(OH) vitamin D level 20 as of 11/25/20       9  Right kidney cyst - 1 4cm in size on CT abdomen from Nov 2020, previously 1cm in May 2017       RTC in 6 months  Obtain blood testing and urine testing prior to next office visit

## 2022-01-19 NOTE — PROGRESS NOTES
NEPHROLOGY OUTPATIENT PROGRESS NOTE   Alhaji Laughlin 70 y o  female MRN: [id number]  DATE: 1/19/2022  Reason for visit:   Chief Complaint   Patient presents with    Follow-up     CKD3b        Patient Instructions   1  CKD stage 3a/b A1 likely DM +/- HTN - baseline 1 4-1 5 with most recent sCr 1 22 as of 1/17/22, below baseline, eGFR 45  -maintain adequate BP and BS control to slow progression of CKD  -UA with microscopy shows trace protein and trace leukocytes, 0-5 WBCs, occasional calcium oxalate crystals with UpCr negligible at 0 16g  -renal u/s shows mild right sided cortical atrophy  No stones  -UA shows leukocyte esterase with few bacteria without nitrites as of 6/16/21, negative for protein  -continue to avoid NSAIDs(aleve, ibuprofen, naproxen, advil, motrin) as this can worsen kidney disease  -repeat BMP prior to next appointment     2  HTN - BP well controlled for age and level of CKD  -Off dyazide since late March 2017 due to rise in sCr  Will need to monitor Cr closely  Also off diovan  -Continue amlodipine 5 mg daily, inderal LA 80mg daily  -off HCTZ  -PRA 97, aldosterone 19 4, renin 0 2  -repeat aldosterone 23 2, repeat renin 0 3 - consider endo consultation if BP rises     3  proteinuria - minimal, UpCr 0 16 as of Jan 2022, likely d/t HTN  -monitor urine protein to creatinine ratio     4  gout - uric acid improved  No recent flares  Will monitor      5  GERD - on pepcid as needed     6  secondary hyperparathyroidism - PTH bit high but no need for binders now  -Last  6-->86 6 as of Jan 2022, monitor PTH/phos/mag  -note, not anemic per prior labs, last Hgb 14 4     7  DM2, diet controlled - better controlled, A1C 5 6 as of June 2021 and stable      8  Vitamin D deficiency - completed ergocalciferol course  Now on vitamin D3 2000u daily, repeat 25(OH) vitamin D level 20 as of 11/25/20       9   Right kidney cyst - 1 4cm in size on CT abdomen from Nov 2020, previously 1cm in May 2017       [facility] in 6 months  Obtain blood testing and urine testing prior to next office visit  Lexus Mandujano was seen today for follow-up  Diagnoses and all orders for this visit:    Type 2 diabetes mellitus with stage 3b chronic kidney disease, without long-term current use of insulin (HCC)    Secondary hyperparathyroidism of renal origin ([facility] [address] )    Benign essential hypertension    Gout, unspecified cause, unspecified chronicity, unspecified site    Proteinuria, unspecified type        Assessment/Plan:  1  CKD stage 3a/b A1 likely DM +/- HTN - baseline 1 4-1 5 with most recent sCr 1 22 as of 1/17/22, below baseline, eGFR 45  -maintain adequate BP and BS control to slow progression of CKD  -UA with microscopy shows trace protein and trace leukocytes, 0-5 WBCs, occasional calcium oxalate crystals with UpCr negligible at 0 16g  -renal u/s shows mild right sided cortical atrophy  No stones  -UA shows leukocyte esterase with few bacteria without nitrites as of 6/16/21, negative for protein  -continue to avoid NSAIDs(aleve, ibuprofen, naproxen, advil, motrin) as this can worsen kidney disease  -repeat BMP prior to next appointment     2  HTN - BP well controlled for age and level of CKD  -Off dyazide since late March 2017 due to rise in sCr  Will need to monitor Cr closely  Also off diovan  -Continue amlodipine 5 mg daily, inderal LA 80mg daily  -off HCTZ  -PRA 97, aldosterone 19 4, renin 0 2  -repeat aldosterone 23 2, repeat renin 0 3 - consider endo consultation if BP rises     3  proteinuria - minimal, UpCr 0 16 as of Jan 2022, likely d/t HTN  -monitor urine protein to creatinine ratio     4  gout - uric acid improved  No recent flares  Will monitor      5  GERD - on pepcid as needed     6  secondary hyperparathyroidism - PTH bit high but no need for binders now  -Last  6-->86 6 as of Jan 2022, monitor PTH/phos/mag  -note, not anemic per prior labs, last Hgb 14 4     7   DM2, diet controlled - better controlled, A1C 5 6 as of June 2021 and stable      8  Vitamin D deficiency - completed ergocalciferol course  Now on vitamin D3 2000u daily, repeat 25(OH) vitamin D level 20 as of 11/25/20       9  Right kidney cyst - 1 4cm in size on CT abdomen from Nov 2020, previously 1cm in May 2017       RTC in 6 months  Obtain blood testing and urine testing prior to next office visit  SUBJECTIVE / INTERVAL HISTORY:  70 y o  female presents in follow up of CKD  Oswald Crowell denies any recent illness/hospitalizations/medication changes since last office visit  Denies NSAID use  HTN - BP at home well controlled  DM type 2 - sugars well controlled  Denies recent gout flares  For 2D echo and nuclear stress test tomorrow  Review of Systems   Constitutional: Negative for chills and fever  HENT: Negative for sore throat and trouble swallowing  Eyes: Negative for visual disturbance  Respiratory: Positive for shortness of breath (with exertion)  Negative for cough  Cardiovascular: Positive for chest pain (intermittent, since last year)  Negative for leg swelling  Gastrointestinal: Negative for abdominal pain, constipation, diarrhea, nausea and vomiting  Endocrine: Negative for polyuria  Genitourinary: Negative for difficulty urinating, dysuria and hematuria  Musculoskeletal: Positive for back pain (chronic)  Negative for neck pain  Skin: Positive for rash (psoriatic rash at times)  Neurological: Negative for dizziness, light-headedness and numbness  Psychiatric/Behavioral: The patient is not nervous/anxious  OBJECTIVE:  /70 (BP Location: Left arm, Patient Position: Sitting, Cuff Size: Large)   Ht 5' 5" (1 651 m)   Wt 96 8 kg (213 lb 6 4 oz)   BMI 35 51 kg/m²  Body mass index is 35 51 kg/m²  Physical exam:  Physical Exam  Vitals and nursing note reviewed  Constitutional:       General: She is not in acute distress  Appearance: She is well-developed  She is not diaphoretic     HENT: Head: Normocephalic and atraumatic  Mouth/Throat:      Pharynx: No oropharyngeal exudate  Eyes:      General: No scleral icterus  Right eye: No discharge  Left eye: No discharge  Neck:      Thyroid: No thyromegaly  Cardiovascular:      Rate and Rhythm: Normal rate and regular rhythm  Heart sounds: No murmur heard  Pulmonary:      Effort: Pulmonary effort is normal  No respiratory distress  Breath sounds: Normal breath sounds  No wheezing  Abdominal:      General: Bowel sounds are normal  There is no distension  Palpations: Abdomen is soft  Musculoskeletal:      Cervical back: Normal range of motion and neck supple  Skin:     General: Skin is warm and dry  Findings: No rash  Neurological:      Mental Status: She is alert  Motor: No abnormal muscle tone  Comments: awake   Psychiatric:         Behavior: Behavior normal          Medications:    Current Outpatient Medications:     amLODIPine (NORVASC) 5 mg tablet, Take 5 mg by mouth daily, Disp: , Rfl:     bimatoprost (LUMIGAN) 0 01 % ophthalmic drops, Administer 1 drop to both eyes daily at bedtime, Disp: , Rfl:     cholecalciferol (VITAMIN D3) 1,000 units tablet, Take 2 tablets (2,000 Units total) by mouth daily (Patient taking differently: Take 4,000 Units by mouth daily  ), Disp: , Rfl:     famotidine (PEPCID) 20 mg tablet, Take 1 tablet by mouth daily as needed  , Disp: , Rfl:     LORazepam (ATIVAN) 0 5 mg tablet, Take 1 tablet by mouth daily as needed, Disp: , Rfl:     propranolol (INDERAL) 80 mg tablet, Take 80 mg by mouth daily , Disp: , Rfl:     simvastatin (ZOCOR) 20 mg tablet, Take 20 mg by mouth daily at bedtime, Disp: , Rfl:     vitamin B-12 (VITAMIN B-12) 1,000 mcg tablet, Take 1 tablet (1,000 mcg total) by mouth daily, Disp: , Rfl:     Allergies:   Allergies as of 01/19/2022 - Reviewed 01/19/2022   Allergen Reaction Noted    Codeine Other (See Comments)     Erythromycin 05/02/2017    Miconazole  05/02/2017       The following portions of the patient's history were reviewed and updated as appropriate: past family history, past surgical history and problem list     Laboratory Results:  Lab Results   Component Value Date    SODIUM 143 01/17/2022    K 4 4 01/17/2022     01/17/2022    CO2 24 01/17/2022    BUN 15 01/17/2022    CREATININE 1 22 01/17/2022    GLUC 108 01/17/2022    CALCIUM 9 1 01/17/2022        Lab Results   Component Value Date    PTH 86 6 01/17/2022    CALCIUM 9 1 01/17/2022       Portions of the record may have been created with voice recognition software   Occasional wrong word or "sound a like" substitutions may have occurred due to the inherent limitations of voice recognition software   Read the chart carefully and recognize, using context, where substitutions have occurred

## 2022-06-30 ENCOUNTER — TELEPHONE (OUTPATIENT)
Dept: NEPHROLOGY | Facility: CLINIC | Age: 72
End: 2022-06-30

## 2022-06-30 NOTE — TELEPHONE ENCOUNTER
I spoke with the pt reminding her to obtain her labs prior to her visit with Dr Sruthi Shin, pt verbalized understanding and will obtain a week prior

## 2022-07-11 LAB
CREAT ?TM UR-SCNC: 118 UMOL/L
EXT PROTEIN URINE: 5.8
PROT/CREAT UR: 0.05 MG/G{CREAT}

## 2022-07-11 NOTE — TELEPHONE ENCOUNTER
I spokt to the pt reminding her to obtain labs prior to her appt with Dr Bonifacio Russell  Pt stated she obtained her labs at [facility] in [facility]

## 2022-07-19 ENCOUNTER — OFFICE VISIT (OUTPATIENT)
Dept: NEPHROLOGY | Facility: CLINIC | Age: 72
End: 2022-07-19
Payer: COMMERCIAL

## 2022-07-19 VITALS
HEART RATE: 57 BPM | OXYGEN SATURATION: 97 % | BODY MASS INDEX: 34.45 KG/M2 | DIASTOLIC BLOOD PRESSURE: 80 MMHG | SYSTOLIC BLOOD PRESSURE: 120 MMHG | WEIGHT: 206.8 LBS | HEIGHT: 65 IN

## 2022-07-19 DIAGNOSIS — N18.32 STAGE 3B CHRONIC KIDNEY DISEASE (HCC): Primary | ICD-10-CM

## 2022-07-19 DIAGNOSIS — I10 BENIGN ESSENTIAL HYPERTENSION: ICD-10-CM

## 2022-07-19 DIAGNOSIS — M79.10 MYALGIA: ICD-10-CM

## 2022-07-19 DIAGNOSIS — N25.81 SECONDARY HYPERPARATHYROIDISM OF RENAL ORIGIN (HCC): ICD-10-CM

## 2022-07-19 DIAGNOSIS — R80.9 PROTEINURIA, UNSPECIFIED TYPE: ICD-10-CM

## 2022-07-19 DIAGNOSIS — M10.9 GOUT, UNSPECIFIED CAUSE, UNSPECIFIED CHRONICITY, UNSPECIFIED SITE: ICD-10-CM

## 2022-07-19 PROCEDURE — 99214 OFFICE O/P EST MOD 30 MIN: CPT | Performed by: INTERNAL MEDICINE

## 2022-07-19 RX ORDER — LOSARTAN POTASSIUM 25 MG/1
25 TABLET ORAL DAILY
Start: 2022-07-19

## 2022-07-19 RX ORDER — ASPIRIN 81 MG/1
81 TABLET ORAL DAILY
COMMUNITY

## 2022-07-19 RX ORDER — CLOPIDOGREL BISULFATE 75 MG/1
75 TABLET ORAL DAILY
COMMUNITY

## 2022-07-19 RX ORDER — LOSARTAN POTASSIUM 25 MG/1
25 TABLET ORAL DAILY
COMMUNITY
End: 2022-07-19 | Stop reason: ALTCHOICE

## 2022-07-19 NOTE — PROGRESS NOTES
NEPHROLOGY OUTPATIENT PROGRESS NOTE   Alicia Arreola 67 y o  female MRN: [id number]  DATE: 7/19/2022  Reason for visit:   Chief Complaint   Patient presents with    Follow-up    Chronic Kidney Disease        Patient Instructions   1  CKD stage 3a/b A1 likely DM +/- HTN - baseline 1 4-1 5 with most recent sCr 1 38 as of 7/11/22, below baseline, eGFR 40-45 ml/min  -maintain adequate BP and BS control to slow progression of CKD  -UA with microscopy showed leukocyte esterase and UpCr 0 05g  -renal u/s shows mild right sided cortical atrophy  No stones  -UA shows leukocyte esterase with few bacteria without nitrites as of 6/16/21, negative for protein  -continue to avoid NSAIDs(aleve, ibuprofen, naproxen, advil, motrin) as this can worsen kidney disease  -repeat BMP prior to next appointment     2  HTN - BP well controlled for age and level of CKD  -Off dyazide since late March 2017 due to rise in sCr  Will need to monitor Cr closely  Also off diovan  -Has been on amlodipine 5 mg daily, inderal LA 80mg daily, losartan 25mg daily  -temporarily off losartan due to muscle aches  -recommend stopping amlodipine  Continue losartan  Monitor BP  If BP remains < 110/60, call office  -off HCTZ  -PRA 97, aldosterone 19 4, renin 0 2  -repeat aldosterone 23 2, repeat renin 0 3 - consider endo consultation if BP rises     3  proteinuria - resolved, UpCr 0 05 as of July 2022, likely d/t HTN  -monitor urine protein to creatinine ratio     4  gout - uric acid improved  No recent flares  Will monitor  5  GERD - on pepcid as needed     6  secondary hyperparathyroidism - PTH bit high but no need for binders now  -Last  6-->86 6 as of Jan 2022, monitor PTH/phos/mag  -note, not anemic per prior labs, last Hgb 13     7  DM2, diet controlled - better controlled, A1C 5 6 as of Feb 2022 and stable  8  Vitamin D deficiency - completed ergocalciferol course   Now on vitamin D3 2000u daily, repeat 25(OH) vitamin D level 20 as of 11/25/20       9  Right kidney cyst - 1 4cm in size on CT abdomen from Nov 2020, previously 1cm in May 2017  10  Muscle aches - on statin despite reduced dose, will check CK now     RTC in 6 months  Obtain blood testing and urine testing prior to next office visit  Check CK as soon as you are able in light of muscle aches on statin  Stop amlodipine and take losartan  Monitor BP and inform office if BP running lower than usual          Meena Carter was seen today for follow-up and chronic kidney disease  Diagnoses and all orders for this visit:    Stage 3b chronic kidney disease ([facility] [address] )  -     Basic metabolic panel; Future  -     Magnesium; Future  -     Phosphorus; Future  -     PTH, intact; Future  -     Urinalysis with microscopic; Future  -     Protein / creatinine ratio, urine; Future    Benign essential hypertension  -     losartan (COZAAR) 25 mg tablet; Take 1 tablet (25 mg total) by mouth daily    Gout, unspecified cause, unspecified chronicity, unspecified site    Proteinuria, unspecified type  -     Protein / creatinine ratio, urine; Future    Secondary hyperparathyroidism of renal origin (HCC)    Myalgia  -     Creatine Kinase, Total; Future        Assessment/Plan:  1  CKD stage 3a/b A1 likely DM +/- HTN - baseline 1 4-1 5 with most recent sCr 1 38 as of 7/11/22, below baseline, eGFR 40-45 ml/min  -maintain adequate BP and BS control to slow progression of CKD  -UA with microscopy showed leukocyte esterase and UpCr 0 05g  -renal u/s shows mild right sided cortical atrophy  No stones  -UA shows leukocyte esterase with few bacteria without nitrites as of 6/16/21, negative for protein  -continue to avoid NSAIDs(aleve, ibuprofen, naproxen, advil, motrin) as this can worsen kidney disease  -repeat BMP prior to next appointment     2  HTN - BP well controlled for age and level of CKD  -Off dyazide since late March 2017 due to rise in sCr  Will need to monitor Cr closely   Also off diovan  -Has been on amlodipine 5 mg daily, inderal LA 80mg daily, losartan 25mg daily  -temporarily off losartan due to muscle aches  -recommend stopping amlodipine  Continue losartan  Monitor BP  If BP remains < 110/60, call office  -off HCTZ  -PRA 97, aldosterone 19 4, renin 0 2  -repeat aldosterone 23 2, repeat renin 0 3 - consider endo consultation if BP rises     3  proteinuria - resolved, UpCr 0 05 as of July 2022, likely d/t HTN  -monitor urine protein to creatinine ratio     4  gout - uric acid improved  No recent flares  Will monitor      5  GERD - on pepcid as needed     6  secondary hyperparathyroidism - PTH bit high but no need for binders now  -Last  6-->86 6 as of Jan 2022, monitor PTH/phos/mag  -note, not anemic per prior labs, last Hgb 13     7  DM2, diet controlled - better controlled, A1C 5 6 as of Feb 2022 and stable      8  Vitamin D deficiency - completed ergocalciferol course  Now on vitamin D3 2000u daily, repeat 25(OH) vitamin D level 20 as of 11/25/20       9  Right kidney cyst - 1 4cm in size on CT abdomen from Nov 2020, previously 1cm in May 2017  10  Muscle aches - on statin despite reduced dose, will check CK now     RTC in 6 months  Obtain blood testing and urine testing prior to next office visit  Check CK as soon as you are able in light of muscle aches on statin  Stop amlodipine and take losartan  Monitor BP and inform office if BP running lower than usual      SUBJECTIVE / INTERVAL HISTORY:  67 y o  female presents in follow up of CKD  Shoshana Recinos denies any recent illness/hospitalizations/medication changes since last office visit  She did have heart cath, is now on ARB  Denies NSAID use  No recent gout flares  BP has been well controlled  Having muscle problems  Still has infection in tooth despite root canal twice  Has been on keflex three times for this  It is painful  Review of Systems   Constitutional: Negative for chills and fever     HENT: Negative for sore throat and trouble swallowing  Eyes: Negative for visual disturbance  Respiratory: Negative for cough and shortness of breath  Cardiovascular: Negative for chest pain and leg swelling  Gastrointestinal: Negative for abdominal pain, constipation, diarrhea, nausea and vomiting  Endocrine: Negative for polyuria  Genitourinary: Negative for difficulty urinating, dysuria and hematuria  Musculoskeletal: Positive for back pain and myalgias  Negative for neck pain  Skin: Positive for rash (on elbows, back and groin)  Neurological: Positive for numbness (tingling in feet)  Negative for dizziness and light-headedness  Psychiatric/Behavioral: The patient is not nervous/anxious  OBJECTIVE:  /80 (BP Location: Left arm, Patient Position: Sitting, Cuff Size: Adult)   Pulse 57   Ht 5' 5" (1 651 m)   Wt 93 8 kg (206 lb 12 8 oz)   SpO2 97%   BMI 34 41 kg/m²  Body mass index is 34 41 kg/m²  Physical exam:  Physical Exam  Vitals and nursing note reviewed  Constitutional:       General: She is not in acute distress  Appearance: Normal appearance  She is well-developed  She is obese  She is not diaphoretic  HENT:      Head: Normocephalic and atraumatic  Nose: Nose normal       Mouth/Throat:      Mouth: Mucous membranes are moist       Pharynx: No oropharyngeal exudate  Eyes:      General: No scleral icterus  Right eye: No discharge  Left eye: No discharge  Neck:      Thyroid: No thyromegaly  Cardiovascular:      Rate and Rhythm: Normal rate and regular rhythm  Heart sounds: No murmur heard  Pulmonary:      Effort: Pulmonary effort is normal  No respiratory distress  Breath sounds: Normal breath sounds  No wheezing  Abdominal:      General: Bowel sounds are normal  There is no distension  Palpations: Abdomen is soft  Musculoskeletal:         General: No swelling  Normal range of motion        Cervical back: Normal range of motion and neck supple  Skin:     General: Skin is warm and dry  Findings: Rash (elbows b/l) present  Neurological:      General: No focal deficit present  Mental Status: She is alert  Motor: No abnormal muscle tone  Comments: awake   Psychiatric:         Mood and Affect: Mood normal          Behavior: Behavior normal          Medications:    Current Outpatient Medications:     aspirin (ECOTRIN LOW STRENGTH) 81 mg EC tablet, Take 81 mg by mouth daily, Disp: , Rfl:     bimatoprost (LUMIGAN) 0 01 % ophthalmic drops, Administer 1 drop to both eyes daily at bedtime, Disp: , Rfl:     clopidogrel (PLAVIX) 75 mg tablet, Take 75 mg by mouth daily, Disp: , Rfl:     famotidine (PEPCID) 20 mg tablet, Take 1 tablet by mouth daily as needed  , Disp: , Rfl:     LORazepam (ATIVAN) 0 5 mg tablet, Take 1 tablet by mouth daily as needed, Disp: , Rfl:     losartan (COZAAR) 25 mg tablet, Take 1 tablet (25 mg total) by mouth daily, Disp: , Rfl:     propranolol (INDERAL) 80 mg tablet, Take 80 mg by mouth daily , Disp: , Rfl:     cholecalciferol (VITAMIN D3) 1,000 units tablet, Take 2 tablets (2,000 Units total) by mouth daily (Patient not taking: Reported on 7/19/2022), Disp: , Rfl:     simvastatin (ZOCOR) 20 mg tablet, Take 20 mg by mouth daily at bedtime (Patient not taking: Reported on 7/19/2022), Disp: , Rfl:     vitamin B-12 (VITAMIN B-12) 1,000 mcg tablet, Take 1 tablet (1,000 mcg total) by mouth daily (Patient not taking: Reported on 7/19/2022), Disp: , Rfl:     Allergies:   Allergies as of 07/19/2022 - Reviewed 07/19/2022   Allergen Reaction Noted    Codeine Other (See Comments)     Erythromycin  05/02/2017    Miconazole  05/02/2017       The following portions of the patient's history were reviewed and updated as appropriate: past family history, past surgical history and problem list     Laboratory Results:  Lab Results   Component Value Date    SODIUM 143 01/17/2022    K 4 4 01/17/2022     01/17/2022 CO2 24 01/17/2022    BUN 15 01/17/2022    CREATININE 1 22 01/17/2022    GLUC 108 01/17/2022    CALCIUM 9 1 01/17/2022        Lab Results   Component Value Date    PTH 86 6 01/17/2022    CALCIUM 9 1 01/17/2022       Portions of the record may have been created with voice recognition software   Occasional wrong word or "sound a like" substitutions may have occurred due to the inherent limitations of voice recognition software   Read the chart carefully and recognize, using context, where substitutions have occurred

## 2022-07-19 NOTE — PATIENT INSTRUCTIONS
1  CKD stage 3a/b A1 likely DM +/- HTN - baseline 1 4-1 5 with most recent sCr 1 38 as of 7/11/22, below baseline, eGFR 40-45 ml/min  -maintain adequate BP and BS control to slow progression of CKD  -UA with microscopy showed leukocyte esterase and UpCr 0 05g  -renal u/s shows mild right sided cortical atrophy  No stones  -UA shows leukocyte esterase with few bacteria without nitrites as of 6/16/21, negative for protein  -continue to avoid NSAIDs(aleve, ibuprofen, naproxen, advil, motrin) as this can worsen kidney disease  -repeat BMP prior to next appointment     2  HTN - BP well controlled for age and level of CKD  -Off dyazide since late March 2017 due to rise in sCr  Will need to monitor Cr closely  Also off diovan  -Has been on amlodipine 5 mg daily, inderal LA 80mg daily, losartan 25mg daily  -temporarily off losartan due to muscle aches  -recommend stopping amlodipine  Continue losartan  Monitor BP  If BP remains < 110/60, call office  -off HCTZ  -PRA 97, aldosterone 19 4, renin 0 2  -repeat aldosterone 23 2, repeat renin 0 3 - consider endo consultation if BP rises     3  proteinuria - resolved, UpCr 0 05 as of July 2022, likely d/t HTN  -monitor urine protein to creatinine ratio     4  gout - uric acid improved  No recent flares  Will monitor  5  GERD - on pepcid as needed     6  secondary hyperparathyroidism - PTH bit high but no need for binders now  -Last  6-->86 6 as of Jan 2022, monitor PTH/phos/mag  -note, not anemic per prior labs, last Hgb 13     7  DM2, diet controlled - better controlled, A1C 5 6 as of Feb 2022 and stable  8  Vitamin D deficiency - completed ergocalciferol course  Now on vitamin D3 2000u daily, repeat 25(OH) vitamin D level 20 as of 11/25/20       9  Right kidney cyst - 1 4cm in size on CT abdomen from Nov 2020, previously 1cm in May 2017  10  Muscle aches - on statin despite reduced dose, will check CK now     RTC in 6 months   Obtain blood testing and urine testing prior to next office visit  Check CK as soon as you are able in light of muscle aches on statin  Stop amlodipine and take losartan   Monitor BP and inform office if BP running lower than usual

## 2022-07-20 ENCOUNTER — TELEPHONE (OUTPATIENT)
Dept: NEPHROLOGY | Facility: CLINIC | Age: 72
End: 2022-07-20

## 2022-07-20 NOTE — TELEPHONE ENCOUNTER
----- Message from Qasim Cordova DO sent at 7/20/2022 11:45 AM EDT -----  Please let the patient know her CK level(muscle breakdown enzyme) is normal

## 2023-02-07 ENCOUNTER — TELEPHONE (OUTPATIENT)
Dept: NEPHROLOGY | Facility: HOSPITAL | Age: 73
End: 2023-02-07

## 2023-02-07 NOTE — TELEPHONE ENCOUNTER
Called and spoke with [employer] to complete their bloodwork prior to their appointment on 2/14 with Dr Jayme Topete at the [facility]

## 2023-02-10 NOTE — TELEPHONE ENCOUNTER
Called and spoke with Patient to complete their bloodwork prior to their appointment on 2/14 with Dr Aniyah Archibald at the [place] location  Patient got labs done at [facility] labs in [place]

## 2023-02-14 ENCOUNTER — OFFICE VISIT (OUTPATIENT)
Dept: NEPHROLOGY | Facility: CLINIC | Age: 73
End: 2023-02-14

## 2023-02-14 VITALS
OXYGEN SATURATION: 96 % | WEIGHT: 211 LBS | DIASTOLIC BLOOD PRESSURE: 70 MMHG | HEART RATE: 66 BPM | BODY MASS INDEX: 35.16 KG/M2 | SYSTOLIC BLOOD PRESSURE: 126 MMHG | HEIGHT: 65 IN

## 2023-02-14 DIAGNOSIS — N25.81 SECONDARY HYPERPARATHYROIDISM OF RENAL ORIGIN (HCC): ICD-10-CM

## 2023-02-14 DIAGNOSIS — M10.9 GOUT, UNSPECIFIED CAUSE, UNSPECIFIED CHRONICITY, UNSPECIFIED SITE: ICD-10-CM

## 2023-02-14 DIAGNOSIS — R80.9 PROTEINURIA, UNSPECIFIED TYPE: ICD-10-CM

## 2023-02-14 DIAGNOSIS — E11.22 TYPE 2 DIABETES MELLITUS WITH STAGE 3B CHRONIC KIDNEY DISEASE, WITHOUT LONG-TERM CURRENT USE OF INSULIN (HCC): ICD-10-CM

## 2023-02-14 DIAGNOSIS — I10 BENIGN ESSENTIAL HYPERTENSION: ICD-10-CM

## 2023-02-14 DIAGNOSIS — N18.32 TYPE 2 DIABETES MELLITUS WITH STAGE 3B CHRONIC KIDNEY DISEASE, WITHOUT LONG-TERM CURRENT USE OF INSULIN (HCC): ICD-10-CM

## 2023-02-14 DIAGNOSIS — E66.01 OBESITY, MORBID (HCC): ICD-10-CM

## 2023-02-14 DIAGNOSIS — N18.32 STAGE 3B CHRONIC KIDNEY DISEASE (HCC): Primary | ICD-10-CM

## 2023-02-14 RX ORDER — ANASTROZOLE 1 MG/1
1 TABLET ORAL DAILY
COMMUNITY

## 2023-02-14 RX ORDER — AMLODIPINE BESYLATE 5 MG/1
5 TABLET ORAL DAILY
COMMUNITY

## 2023-02-14 NOTE — PROGRESS NOTES
NEPHROLOGY OUTPATIENT PROGRESS NOTE   Ruma Angel 67 y o  female MRN: [id number]  DATE: 2/14/2023  Reason for visit:   Chief Complaint   Patient presents with   • Follow-up   • Chronic Kidney Disease        Patient Instructions   1  CKD stage 3a/b A1 likely DM +/- HTN - baseline 1 4-1 5 with most recent sCr 1 27 as of 2/10/23, below baseline, eGFR 45 ml/min  -maintain adequate BP and BS control to slow progression of CKD  -UA with microscopy bland  -renal u/s shows mild right sided cortical atrophy  No stones  -UA shows leukocyte esterase with few bacteria without nitrites as of 6/16/21, negative for protein  -continue to avoid NSAIDs(aleve, ibuprofen, naproxen, advil, motrin) as this can worsen kidney disease  -repeat BMP prior to next appointment     2  HTN - BP well controlled for age and level of CKD  -Off dyazide since late March 2017 due to rise in sCr  Will need to monitor Cr closely  Also off diovan  -Has been on amlodipine 5 mg daily, inderal LA 80mg daily, losartan 25mg daily  -off HCTZ  -PRA 97, aldosterone 19 4, renin 0 2  -repeat aldosterone 23 2, repeat renin 0 3 - consider endo consultation if BP rises     3  proteinuria - resolved  -monitor urine protein to creatinine ratio     4  gout - uric acid improved  No recent flares  Will monitor  5  GERD - on pepcid as needed     6  secondary hyperparathyroidism - PTH normal range, monitor this     7  DM2, diet controlled - better controlled, A1C 5 6 as of Feb 2022 and stable  8  Vitamin D deficiency - completed ergocalciferol course  Now on vitamin D3 5000u daily, repeat 25(OH) vitamin D level 20 as of 11/25/20  Check vitamin D level  9  Right kidney cyst - 1 4cm in size on CT abdomen from Nov 2020, previously 1cm in May 2017  RTC in 6 months  Obtain blood testing and urine testing prior to next office visit  Daksha Singhxon was seen today for follow-up and chronic kidney disease      Diagnoses and all orders for this visit:    Stage 3b chronic kidney disease ([place] [address] )  -     Urinalysis with microscopic; Future  -     Protein / creatinine ratio, urine; Future  -     Comprehensive metabolic panel; Future    Secondary hyperparathyroidism of renal origin ([place] [address] )    Benign essential hypertension    Type 2 diabetes mellitus with stage 3b chronic kidney disease, without long-term current use of insulin ([facility])    Gout, unspecified cause, unspecified chronicity, unspecified site    Proteinuria, unspecified type  -     Protein / creatinine ratio, urine; Future    Obesity, morbid ([place] [address] )        Assessment/Plan:  1  CKD stage 3a/b A1 likely DM +/- HTN - baseline 1 4-1 5 with most recent sCr 1 27 as of 2/10/23, below baseline, eGFR 45 ml/min  -maintain adequate BP and BS control to slow progression of CKD  -UA with microscopy bland  -renal u/s shows mild right sided cortical atrophy  No stones  -UA shows leukocyte esterase with few bacteria without nitrites as of 6/16/21, negative for protein  -continue to avoid NSAIDs(aleve, ibuprofen, naproxen, advil, motrin) as this can worsen kidney disease  -repeat BMP prior to next appointment     2  HTN - BP well controlled for age and level of CKD  -Off dyazide since late March 2017 due to rise in sCr  Will need to monitor Cr closely  Also off diovan  -Has been on amlodipine 5 mg daily, inderal LA 80mg daily, losartan 25mg daily  -off HCTZ  -PRA 97, aldosterone 19 4, renin 0 2  -repeat aldosterone 23 2, repeat renin 0 3 - consider endo consultation if BP rises     3  proteinuria - resolved  -monitor urine protein to creatinine ratio     4  gout - uric acid improved  No recent flares  Will monitor      5  GERD - on pepcid as needed     6  secondary hyperparathyroidism - PTH normal range, monitor this     7  DM2, diet controlled - better controlled, A1C 5 6 as of Feb 2022 and stable      8  Vitamin D deficiency - completed ergocalciferol course  Now on vitamin D3 5000u daily, repeat 25(OH) vitamin D level 20 as of 11/25/20  Check vitamin D level       9  Right kidney cyst - 1 4cm in size on CT abdomen from Nov 2020, previously 1cm in May 2017       RTC in 6 months  Obtain blood testing and urine testing prior to next office visit      SUBJECTIVE / INTERVAL HISTORY:  67 y o  female presents in follow up of CKD  Lesley Del Toro diagnosed with lobular carcinoma of breast, s/p surgery and radiation  On anastrozole  Off statin  Denies NSAID use  Review of Systems   Constitutional: Negative for chills and fever  HENT: Negative for sore throat and trouble swallowing  Eyes: Negative for visual disturbance  Respiratory: Negative for cough and shortness of breath  Cardiovascular: Negative for chest pain and leg swelling  Gastrointestinal: Negative for abdominal pain, constipation, diarrhea, nausea and vomiting  Endocrine: Negative for polyuria  Genitourinary: Negative for difficulty urinating, dysuria and hematuria  Musculoskeletal: Positive for back pain  Negative for neck pain  Skin: Positive for rash (psoriasis)  Neurological: Positive for numbness (hand numbness since surgery)  Negative for dizziness and light-headedness  Hematological: Does not bruise/bleed easily  Psychiatric/Behavioral: The patient is not nervous/anxious  OBJECTIVE:  /70 (BP Location: Left arm, Patient Position: Sitting, Cuff Size: Adult)   Pulse 66   Ht 5' 5" (1 651 m)   Wt 95 7 kg (211 lb)   SpO2 96%   BMI 35 11 kg/m²  Body mass index is 35 11 kg/m²  Physical exam:  Physical Exam  Vitals and nursing note reviewed  Constitutional:       General: She is not in acute distress  Appearance: Normal appearance  She is well-developed  She is not diaphoretic  HENT:      Head: Normocephalic and atraumatic  Nose: Nose normal       Mouth/Throat:      Mouth: Mucous membranes are moist       Pharynx: No oropharyngeal exudate  Eyes:      General: No scleral icterus  Right eye: No discharge  Left eye: No discharge  Comments: eyeglasses   Neck:      Thyroid: No thyromegaly  Cardiovascular:      Rate and Rhythm: Normal rate and regular rhythm  Heart sounds: No murmur heard  Pulmonary:      Effort: Pulmonary effort is normal  No respiratory distress  Breath sounds: Normal breath sounds  No wheezing  Abdominal:      General: Bowel sounds are normal  There is no distension  Palpations: Abdomen is soft  Musculoskeletal:         General: No swelling  Normal range of motion  Cervical back: Normal range of motion and neck supple  Skin:     General: Skin is warm and dry  Coloration: Skin is not jaundiced  Findings: No rash  Neurological:      General: No focal deficit present  Mental Status: She is alert  Motor: No abnormal muscle tone  Comments: awake   Psychiatric:         Mood and Affect: Mood normal          Behavior: Behavior normal          Medications:    Current Outpatient Medications:   •  amLODIPine (NORVASC) 5 mg tablet, Take 5 mg by mouth daily, Disp: , Rfl:   •  anastrozole (ARIMIDEX) 1 mg tablet, Take 1 mg by mouth daily, Disp: , Rfl:   •  bimatoprost (LUMIGAN) 0 01 % ophthalmic drops, Administer 1 drop to both eyes daily at bedtime, Disp: , Rfl:   •  cholecalciferol (VITAMIN D3) 1,000 units tablet, Take 2 tablets (2,000 Units total) by mouth daily, Disp: , Rfl:   •  clopidogrel (PLAVIX) 75 mg tablet, Take 75 mg by mouth daily, Disp: , Rfl:   •  famotidine (PEPCID) 20 mg tablet, Take 1 tablet by mouth daily as needed  , Disp: , Rfl:   •  LORazepam (ATIVAN) 0 5 mg tablet, Take 1 tablet by mouth daily as needed, Disp: , Rfl:   •  losartan (COZAAR) 25 mg tablet, Take 1 tablet (25 mg total) by mouth daily, Disp: , Rfl:   •  propranolol (INDERAL) 80 mg tablet, Take 80 mg by mouth daily , Disp: , Rfl:   •  vitamin B-12 (VITAMIN B-12) 1,000 mcg tablet, Take 1 tablet (1,000 mcg total) by mouth daily, Disp: , Rfl:     Allergies:   Allergies as of 02/14/2023 - Reviewed 02/14/2023   Allergen Reaction Noted   • Codeine Other (See Comments)    • Erythromycin  05/02/2017   • Miconazole  05/02/2017       The following portions of the patient's history were reviewed and updated as appropriate: past family history, past surgical history and problem list     Laboratory Results:  Lab Results   Component Value Date    SODIUM 143 01/17/2022    K 4 4 01/17/2022     01/17/2022    CO2 24 01/17/2022    BUN 15 01/17/2022    CREATININE 1 22 01/17/2022    GLUC 108 01/17/2022    CALCIUM 9 1 01/17/2022        Lab Results   Component Value Date    PTH 86 6 01/17/2022    CALCIUM 9 1 01/17/2022       Portions of the record may have been created with voice recognition software   Occasional wrong word or "sound a like" substitutions may have occurred due to the inherent limitations of voice recognition software   Read the chart carefully and recognize, using context, where substitutions have occurred

## 2023-02-14 NOTE — PATIENT INSTRUCTIONS
1  CKD stage 3a/b A1 likely DM +/- HTN - baseline 1 4-1 5 with most recent sCr 1 27 as of 2/10/23, below baseline, eGFR 45 ml/min  -maintain adequate BP and BS control to slow progression of CKD  -UA with microscopy bland  -renal u/s shows mild right sided cortical atrophy  No stones  -UA shows leukocyte esterase with few bacteria without nitrites as of 6/16/21, negative for protein  -continue to avoid NSAIDs(aleve, ibuprofen, naproxen, advil, motrin) as this can worsen kidney disease  -repeat BMP prior to next appointment     2  HTN - BP well controlled for age and level of CKD  -Off dyazide since late March 2017 due to rise in sCr  Will need to monitor Cr closely  Also off diovan  -Has been on amlodipine 5 mg daily, inderal LA 80mg daily, losartan 25mg daily  -off HCTZ  -PRA 97, aldosterone 19 4, renin 0 2  -repeat aldosterone 23 2, repeat renin 0 3 - consider endo consultation if BP rises     3  proteinuria - resolved  -monitor urine protein to creatinine ratio     4  gout - uric acid improved  No recent flares  Will monitor  5  GERD - on pepcid as needed     6  secondary hyperparathyroidism - PTH normal range, monitor this     7  DM2, diet controlled - better controlled, A1C 5 6 as of Feb 2022 and stable  8  Vitamin D deficiency - completed ergocalciferol course  Now on vitamin D3 5000u daily, repeat 25(OH) vitamin D level 20 as of 11/25/20  Check vitamin D level  9  Right kidney cyst - 1 4cm in size on CT abdomen from Nov 2020, previously 1cm in May 2017  RTC in 6 months  Obtain blood testing and urine testing prior to next office visit

## 2023-03-24 ENCOUNTER — TELEPHONE (OUTPATIENT)
Dept: NEPHROLOGY | Facility: CLINIC | Age: 73
End: 2023-03-24

## 2023-03-24 NOTE — TELEPHONE ENCOUNTER
Spoke with Kanchan Bowers with [facility] stating patient has a colonoscopy on April 20th  Patient told colon and rectal that per Dr Lary Simon, patient has to take Go Lightly Pills rather than the liquid  Please advise       Call back for Joel Singh is [phone] ext [phone]

## 2023-03-29 ENCOUNTER — DOCUMENTATION (OUTPATIENT)
Dept: NEPHROLOGY | Facility: HOSPITAL | Age: 73
End: 2023-03-29

## 2023-03-29 NOTE — PROGRESS NOTES
Called and spoke with Danielle Joya from  Colorectal team know that this patient has CKD  She must not have phosphate containing medications as this could cause phosphate toxicity  As long as the prep is not contraindicated in CKD, I do not see an issue   Thanks

## 2023-08-02 ENCOUNTER — OFFICE VISIT (OUTPATIENT)
Dept: NEPHROLOGY | Facility: CLINIC | Age: 73
End: 2023-08-02

## 2023-08-02 VITALS
HEART RATE: 54 BPM | SYSTOLIC BLOOD PRESSURE: 120 MMHG | HEIGHT: 65 IN | DIASTOLIC BLOOD PRESSURE: 60 MMHG | OXYGEN SATURATION: 98 % | WEIGHT: 211 LBS | BODY MASS INDEX: 35.16 KG/M2

## 2023-08-02 DIAGNOSIS — N25.81 SECONDARY HYPERPARATHYROIDISM OF RENAL ORIGIN (HCC): ICD-10-CM

## 2023-08-02 DIAGNOSIS — N18.32 STAGE 3B CHRONIC KIDNEY DISEASE (HCC): Primary | ICD-10-CM

## 2023-08-02 DIAGNOSIS — I10 BENIGN ESSENTIAL HYPERTENSION: ICD-10-CM

## 2023-08-02 DIAGNOSIS — R80.8 OTHER PROTEINURIA: ICD-10-CM

## 2023-08-02 DIAGNOSIS — M10.00 IDIOPATHIC GOUT, UNSPECIFIED CHRONICITY, UNSPECIFIED SITE: ICD-10-CM

## 2023-08-02 RX ORDER — ANASTROZOLE 1 MG/1
1 TABLET ORAL DAILY
COMMUNITY
Start: 2022-12-08 | End: 2023-12-08

## 2023-08-02 RX ORDER — CLOBETASOL PROPIONATE 0.5 MG/G
CREAM TOPICAL
COMMUNITY
Start: 2023-06-01

## 2023-08-02 RX ORDER — ASPIRIN 81 MG/1
81 TABLET, CHEWABLE ORAL DAILY
COMMUNITY

## 2023-08-02 RX ORDER — AMLODIPINE BESYLATE 5 MG/1
TABLET ORAL
COMMUNITY

## 2023-08-02 RX ORDER — EZETIMIBE 10 MG/1
TABLET ORAL
COMMUNITY
Start: 2022-10-18

## 2023-08-02 RX ORDER — EZETIMIBE 10 MG/1
10 TABLET ORAL DAILY
COMMUNITY
Start: 2023-06-03 | End: 2023-08-02 | Stop reason: SDUPTHER

## 2023-08-02 NOTE — PATIENT INSTRUCTIONS
1. CKD stage 3a/b A1 likely DM +/- HTN - baseline 1.4-1.5 with most recent sCr 1.43 as of 7/31/23, at baseline, eGFR 39 ml/min  -maintain adequate BP and BS control to slow progression of CKD  -UA with microscopy bland  -renal u/s shows mild right sided cortical atrophy. No stones  -UA shows leukocyte esterase with few bacteria without nitrites as of 6/16/21, negative for protein  -UpCr 0.07g  -continue to avoid NSAIDs(aleve, ibuprofen, naproxen, advil, motrin) as this can worsen kidney disease  -repeat BMP prior to next appointment     2. HTN - BP well controlled for age and level of CKD  -Off dyazide since late March 2017 due to rise in sCr. Will need to monitor Cr closely. Also off diovan  -Has been on amlodipine 5 mg daily, inderal LA 80mg daily, losartan 25mg daily  -off HCTZ  -PRA 97, aldosterone 19.4, renin 0.2  -repeat aldosterone 23.2, repeat renin 0.3 - consider endo consultation if BP rises     3. proteinuria - resolved  -monitor urine protein to creatinine ratio     4. gout - uric acid improved. No recent flares. Will monitor. 5. GERD - on pepcid as needed     6. secondary hyperparathyroidism - PTH normal range, monitor this     7. DM2, diet controlled - better controlled, A1C 5.6 as of Feb. 2022 and stable. 8. Vitamin D deficiency - completed ergocalciferol course. Now on vitamin D3 5000u daily, repeat 25(OH) vitamin D level 20 as of 11/25/20. Monitor vitamin D level. 9. Right kidney cyst - 1.4cm in size on CT abdomen from Nov. 2020, previously 1cm in May 2017. RTC in 6 months. Obtain blood testing and urine testing prior to next office visit.

## 2023-08-02 NOTE — PROGRESS NOTES
NEPHROLOGY OUTPATIENT PROGRESS NOTE   Nadeem Veras 68 y.o. female MRN: [id number]  DATE: 8/2/2023  Reason for visit:   Chief Complaint   Patient presents with   • Follow-up   • Chronic Kidney Disease        Patient Instructions   1. CKD stage 3a/b A1 likely DM +/- HTN - baseline 1.4-1.5 with most recent sCr 1.43 as of 7/31/23, at baseline, eGFR 39 ml/min  -maintain adequate BP and BS control to slow progression of CKD  -UA with microscopy bland  -renal u/s shows mild right sided cortical atrophy. No stones  -UA shows leukocyte esterase with few bacteria without nitrites as of 6/16/21, negative for protein  -UpCr 0.07g  -continue to avoid NSAIDs(aleve, ibuprofen, naproxen, advil, motrin) as this can worsen kidney disease  -repeat BMP prior to next appointment     2. HTN - BP well controlled for age and level of CKD  -Off dyazide since late March 2017 due to rise in sCr. Will need to monitor Cr closely. Also off diovan  -Has been on amlodipine 5 mg daily, inderal LA 80mg daily, losartan 25mg daily  -off HCTZ  -PRA 97, aldosterone 19.4, renin 0.2  -repeat aldosterone 23.2, repeat renin 0.3 - consider endo consultation if BP rises     3. proteinuria - resolved  -monitor urine protein to creatinine ratio     4. gout - uric acid improved. No recent flares. Will monitor. 5. GERD - on pepcid as needed     6. secondary hyperparathyroidism - PTH normal range, monitor this     7. DM2, diet controlled - better controlled, A1C 5.6 as of Feb. 2022 and stable. 8. Vitamin D deficiency - completed ergocalciferol course. Now on vitamin D3 5000u daily, repeat 25(OH) vitamin D level 20 as of 11/25/20. Monitor vitamin D level. 9. Right kidney cyst - 1.4cm in size on CT abdomen from Nov. 2020, previously 1cm in May 2017. RTC in 6 months. Obtain blood testing and urine testing prior to next office visit. Bridget Herring was seen today for follow-up and chronic kidney disease.     Diagnoses and all orders for this visit:    Stage 3b chronic kidney disease ([address] [facility])  -     Basic metabolic panel; Future  -     Urinalysis with microscopic; Future  -     Protein / creatinine ratio, urine; Future  -     Magnesium; Future  -     Phosphorus; Future  -     PTH, intact; Future    Secondary hyperparathyroidism of renal origin ([address])  -     PTH, intact; Future    Benign essential hypertension    Other proteinuria  -     Protein / creatinine ratio, urine; Future    Idiopathic gout, unspecified chronicity, unspecified site        Assessment/Plan:  1. CKD stage 3a/b A1 likely DM +/- HTN - baseline 1.4-1.5 with most recent sCr 1.43 as of 7/31/23, at baseline, eGFR 39 ml/min  -maintain adequate BP and BS control to slow progression of CKD  -UA with microscopy bland  -renal u/s shows mild right sided cortical atrophy. No stones  -UA shows leukocyte esterase with few bacteria without nitrites as of 6/16/21, negative for protein  -UpCr 0.07g  -continue to avoid NSAIDs(aleve, ibuprofen, naproxen, advil, motrin) as this can worsen kidney disease  -repeat BMP prior to next appointment     2. HTN - BP well controlled for age and level of CKD  -Off dyazide since late March 2017 due to rise in sCr. Will need to monitor Cr closely. Also off diovan  -Has been on amlodipine 5 mg daily, inderal LA 80mg daily, losartan 25mg daily  -off HCTZ  -PRA 97, aldosterone 19.4, renin 0.2  -repeat aldosterone 23.2, repeat renin 0.3 - consider endo consultation if BP rises     3. proteinuria - resolved  -monitor urine protein to creatinine ratio     4. gout - uric acid improved. No recent flares. Will monitor.     5. GERD - on pepcid as needed     6. secondary hyperparathyroidism - PTH normal range, monitor this     7. DM2, diet controlled - better controlled, A1C 5.6 as of Feb. 2022 and stable.     8. Vitamin D deficiency - completed ergocalciferol course. Now on vitamin D3 5000u daily, repeat 25(OH) vitamin D level 20 as of 11/25/20. Monitor vitamin D level.      9. Right kidney cyst - 1.4cm in size on CT abdomen from Nov. 2020, previously 1cm in May 2017.      RTC in 6 months. Obtain blood testing and urine testing prior to next office visit.     SUBJECTIVE / INTERVAL HISTORY:  68 y.o. female presents in follow up of CKD. Tracy Iverson is s/p cataract surgery, colonoscopy with 14 polyps removed with bleed  After that and admission, and has completed XRT for breast cancer. Remains on anastrozole. No recent gout flares. HTN - BP has been well controlled. Denies NSAID use. Review of Systems   Constitutional: Negative for chills and fever. HENT: Negative for sore throat and trouble swallowing. Eyes: Negative for visual disturbance. Respiratory: Negative for cough and shortness of breath. Cardiovascular: Negative for chest pain and leg swelling. Gastrointestinal: Positive for constipation (at times). Negative for abdominal pain, diarrhea, nausea and vomiting. Endocrine: Negative for polyuria. Genitourinary: Negative for difficulty urinating, dysuria and hematuria. Musculoskeletal: Positive for back pain. Negative for neck pain. Joint pain from anastrazole   Skin: Positive for rash (from psoriasis). Neurological: Negative for dizziness, light-headedness and numbness. Psychiatric/Behavioral: The patient is not nervous/anxious. OBJECTIVE:  /60 (BP Location: Left arm, Patient Position: Sitting, Cuff Size: Adult)   Pulse (!) 54   Ht 5' 5" (1.651 m)   Wt 95.7 kg (211 lb)   SpO2 98%   BMI 35.11 kg/m²  Body mass index is 35.11 kg/m². Physical exam:  Physical Exam  Vitals and nursing note reviewed. Constitutional:       General: She is not in acute distress. Appearance: Normal appearance. She is well-developed. She is not diaphoretic. HENT:      Head: Normocephalic and atraumatic. Nose: Nose normal.      Mouth/Throat:      Mouth: Mucous membranes are moist.      Pharynx: No oropharyngeal exudate.    Eyes: General: No scleral icterus. Right eye: No discharge. Left eye: No discharge. Comments: eyeglasses   Neck:      Thyroid: No thyromegaly. Cardiovascular:      Rate and Rhythm: Normal rate and regular rhythm. Heart sounds: No murmur heard. Pulmonary:      Effort: Pulmonary effort is normal. No respiratory distress. Breath sounds: Normal breath sounds. No wheezing. Abdominal:      General: Bowel sounds are normal. There is no distension. Palpations: Abdomen is soft. Musculoskeletal:         General: No swelling. Cervical back: Normal range of motion and neck supple. Skin:     General: Skin is warm and dry. Coloration: Skin is not jaundiced. Findings: No rash. Neurological:      General: No focal deficit present. Mental Status: She is alert. Motor: No abnormal muscle tone.       Comments: awake   Psychiatric:         Mood and Affect: Mood normal.         Behavior: Behavior normal.         Medications:    Current Outpatient Medications:   •  amLODIPine (Norvasc) 5 mg tablet, , Disp: , Rfl:   •  anastrozole (ARIMIDEX) 1 mg tablet, Take 1 mg by mouth daily, Disp: , Rfl:   •  aspirin 81 mg chewable tablet, Chew 81 mg daily, Disp: , Rfl:   •  bimatoprost (LUMIGAN) 0.01 % ophthalmic drops, Administer 1 drop to both eyes daily at bedtime, Disp: , Rfl:   •  Calcium Carbonate 1500 (600 Ca) MG TABS, One daily, Disp: , Rfl:   •  cholecalciferol (VITAMIN D3) 1,000 units tablet, Take 2 tablets (2,000 Units total) by mouth daily (Patient taking differently: Take 5,000 Units by mouth daily), Disp: , Rfl:   •  clobetasol (TEMOVATE) 0.05 % cream, APPLY THIN COAT TO AFFECTED AREA TWICE A DAY, Disp: , Rfl:   •  ezetimibe (ZETIA) 10 mg tablet, , Disp: , Rfl:   •  famotidine (PEPCID) 20 mg tablet, Take 1 tablet by mouth daily as needed  , Disp: , Rfl:   •  LORazepam (ATIVAN) 0.5 mg tablet, Take 1 tablet by mouth daily as needed, Disp: , Rfl:   •  losartan (COZAAR) 25 mg tablet, Take 1 tablet (25 mg total) by mouth daily, Disp: , Rfl:   •  propranolol (INDERAL) 80 mg tablet, Take 80 mg by mouth daily , Disp: , Rfl:   •  vitamin B-12 (VITAMIN B-12) 1,000 mcg tablet, Take 1 tablet (1,000 mcg total) by mouth daily, Disp: , Rfl:     Allergies: Allergies as of 08/02/2023 - Reviewed 08/02/2023   Allergen Reaction Noted   • Codeine Other (See Comments)    • Erythromycin  05/02/2017   • Miconazole  05/02/2017       The following portions of the patient's history were reviewed and updated as appropriate: past family history, past surgical history and problem list.    Laboratory Results:  Lab Results   Component Value Date    SODIUM 143 01/17/2022    K 4.4 01/17/2022     01/17/2022    CO2 24 01/17/2022    BUN 15 01/17/2022    CREATININE 1.22 01/17/2022    GLUC 108 01/17/2022    CALCIUM 9.1 01/17/2022        Lab Results   Component Value Date    PTH 86.6 01/17/2022    CALCIUM 9.1 01/17/2022       Portions of the record may have been created with voice recognition software.  Occasional wrong word or "sound a like" substitutions may have occurred due to the inherent limitations of voice recognition software.  Read the chart carefully and recognize, using context, where substitutions have occurred.

## 2023-11-13 ENCOUNTER — VBI (OUTPATIENT)
Dept: ADMINISTRATIVE | Facility: OTHER | Age: 73
End: 2023-11-13

## 2023-11-26 ENCOUNTER — VBI (OUTPATIENT)
Dept: ADMINISTRATIVE | Facility: OTHER | Age: 73
End: 2023-11-26

## 2024-05-01 ENCOUNTER — TELEPHONE (OUTPATIENT)
Dept: NEPHROLOGY | Facility: CLINIC | Age: 74
End: 2024-05-01

## 2024-05-01 NOTE — TELEPHONE ENCOUNTER
Called patient reminding to please complete labwork prior to 5/9 appointment with Dr. Abdi. Patient stating that she will do labs on Monday.

## 2024-05-07 LAB
ANION GAP SERPL CALCULATED.3IONS-SCNC: 10 MMOL/L (ref 3–11)
BUN SERPL-MCNC: 17 MG/DL (ref 7–25)
CALCIUM SERPL-MCNC: 9.2 MG/DL (ref 8.5–10.1)
CHLORIDE SERPL-SCNC: 104 MMOL/L (ref 100–109)
CO2 SERPL-SCNC: 27 MMOL/L (ref 21–31)
CREAT SERPL-MCNC: 1.45 MG/DL (ref 0.4–1.1)
CREAT UR-MCNC: 91.3 MG/DL (ref 50–200)
CYTOLOGY CMNT CVX/VAG CYTO-IMP: ABNORMAL
GFR/BSA.PRED SERPLBLD CYS-BASED-ARV: 38 ML/MIN/{1.73_M2}
GLUCOSE SERPL-MCNC: 133 MG/DL (ref 65–99)
GLUCOSE UR QL STRIP: NEGATIVE MG/DL
HGB UR QL STRIP: NEGATIVE MG/DL
KETONES UR QL STRIP: NEGATIVE MG/DL
LEUKOCYTE ESTERASE UR QL STRIP: 25 /UL
MAGNESIUM SERPL-MCNC: 2.1 MG/DL (ref 1.4–2.2)
MUCOUS THREADS URNS QL MICRO: ABNORMAL
NITRITE UR QL STRIP: NEGATIVE
PH UR: 5 [PH] (ref 4.5–8)
PHOSPHATE SERPL-MCNC: 3.9 MG/DL (ref 2.3–4.6)
POTASSIUM SERPL-SCNC: 4 MMOL/L (ref 3.5–5.2)
PROT 24H UR-MRATE: NEGATIVE MG/DL
PROT UR-MCNC: 12.6 MG/DL
PROT/CREAT UR: 0.14
PTH-INTACT SERPL-MCNC: 76.1 PG/ML (ref 12–88)
RBC #/AREA URNS HPF: ABNORMAL /HPF (ref 0–2)
SL AMB POCT URINE COMMENT: ABNORMAL
SODIUM SERPL-SCNC: 141 MMOL/L (ref 135–145)
SP GR UR: 1.01 (ref 1–1.03)
SQUAMOUS #/AREA URNS HPF: ABNORMAL /LPF (ref 0–5)
WBC #/AREA URNS HPF: ABNORMAL /HPF (ref 0–5)

## 2024-05-09 ENCOUNTER — OFFICE VISIT (OUTPATIENT)
Dept: NEPHROLOGY | Facility: CLINIC | Age: 74
End: 2024-05-09
Payer: COMMERCIAL

## 2024-05-09 VITALS
DIASTOLIC BLOOD PRESSURE: 64 MMHG | SYSTOLIC BLOOD PRESSURE: 124 MMHG | HEIGHT: 65 IN | BODY MASS INDEX: 36.65 KG/M2 | WEIGHT: 220 LBS | HEART RATE: 63 BPM

## 2024-05-09 DIAGNOSIS — I10 BENIGN ESSENTIAL HYPERTENSION: ICD-10-CM

## 2024-05-09 DIAGNOSIS — N25.81 SECONDARY HYPERPARATHYROIDISM OF RENAL ORIGIN (HCC): ICD-10-CM

## 2024-05-09 DIAGNOSIS — R80.8 OTHER PROTEINURIA: ICD-10-CM

## 2024-05-09 DIAGNOSIS — N18.32 TYPE 2 DIABETES MELLITUS WITH STAGE 3B CHRONIC KIDNEY DISEASE, WITHOUT LONG-TERM CURRENT USE OF INSULIN (HCC): ICD-10-CM

## 2024-05-09 DIAGNOSIS — E11.22 TYPE 2 DIABETES MELLITUS WITH STAGE 3B CHRONIC KIDNEY DISEASE, WITHOUT LONG-TERM CURRENT USE OF INSULIN (HCC): ICD-10-CM

## 2024-05-09 DIAGNOSIS — M10.00 IDIOPATHIC GOUT, UNSPECIFIED CHRONICITY, UNSPECIFIED SITE: ICD-10-CM

## 2024-05-09 DIAGNOSIS — E66.01 OBESITY, MORBID (HCC): ICD-10-CM

## 2024-05-09 DIAGNOSIS — N18.32 STAGE 3B CHRONIC KIDNEY DISEASE (HCC): Primary | ICD-10-CM

## 2024-05-09 PROCEDURE — G2211 COMPLEX E/M VISIT ADD ON: HCPCS | Performed by: INTERNAL MEDICINE

## 2024-05-09 PROCEDURE — 99214 OFFICE O/P EST MOD 30 MIN: CPT | Performed by: INTERNAL MEDICINE

## 2024-05-09 NOTE — PATIENT INSTRUCTIONS
1. CKD stage 3b A1 likely DM +/- HTN - baseline 1.4-1.5 with most recent sCr 1.45 as of 5/7/24, at baseline, eGFR 38 ml/min  -maintain adequate BP and BS control to slow progression of CKD  -UA with microscopy shows trace leukocytes, trace protein with rare RBCs  -renal u/s shows mild right sided cortical atrophy. No stones  -UA shows leukocyte esterase with few bacteria without nitrites as of 6/16/21, negative for protein  -UpCr 0.14g  -continue to avoid NSAIDs(aleve, ibuprofen, naproxen, advil, motrin) as this can worsen kidney disease  -repeat BMP prior to next appointment     2. HTN - BP well controlled for age and level of CKD in office but higher at home  -Off dyazide since late March 2017 due to rise in sCr. Will need to monitor Cr closely.   -Has been on amlodipine 5 mg daily, inderal LA 120mg daily, losartan 50mg daily  -call the office with BP log and heart rates for a week.   -off HCTZ  -PRA 97, aldosterone 19.4, renin 0.2  -repeat aldosterone 23.2, repeat renin 0.3 - consider endo consultation if BP rises  -goal < 150/90     3. proteinuria - stable  -monitor urine protein to creatinine ratio     4. gout - uric acid improved. No recent flares. Will monitor.     5. GERD - on pepcid as needed     6. secondary hyperparathyroidism - PTH normal range, 76.1 as of 5/7/24, monitor this     7. DM2, diet controlled - better controlled, A1C 5.9 as of 5/7/24 and stable.     8. Vitamin D deficiency - completed ergocalciferol course. Now on vitamin D3 5000u daily, repeat 25(OH) vitamin D level 20 as of 11/25/20. Monitor vitamin D level.      9. Right kidney cyst - 1.4cm in size on CT abdomen from Nov. 2020, previously 1cm in May 2017.      RTC in 6 months. Obtain blood testing and urine testing prior to next office visit.

## 2024-05-09 NOTE — PROGRESS NOTES
NEPHROLOGY OUTPATIENT PROGRESS NOTE   Christina Marte 74 y.o. female MRN: [id number]  DATE: 5/9/2024  Reason for visit:   Chief Complaint   Patient presents with    Chronic Kidney Disease    Follow-up        Patient Instructions   1. CKD stage 3b A1 likely DM +/- HTN - baseline 1.4-1.5 with most recent sCr 1.45 as of 5/7/24, at baseline, eGFR 38 ml/min  -maintain adequate BP and BS control to slow progression of CKD  -UA with microscopy shows trace leukocytes, trace protein with rare RBCs  -renal u/s shows mild right sided cortical atrophy. No stones  -UA shows leukocyte esterase with few bacteria without nitrites as of 6/16/21, negative for protein  -UpCr 0.14g  -continue to avoid NSAIDs(aleve, ibuprofen, naproxen, advil, motrin) as this can worsen kidney disease  -repeat BMP prior to next appointment     2. HTN - BP well controlled for age and level of CKD in office but higher at home  -Off dyazide since late March 2017 due to rise in sCr. Will need to monitor Cr closely.   -Has been on amlodipine 5 mg daily, inderal LA 120mg daily, losartan 50mg daily  -call the office with BP log and heart rates for a week.   -off HCTZ  -PRA 97, aldosterone 19.4, renin 0.2  -repeat aldosterone 23.2, repeat renin 0.3 - consider endo consultation if BP rises  -goal < 150/90     3. proteinuria - stable  -monitor urine protein to creatinine ratio     4. gout - uric acid improved. No recent flares. Will monitor.     5. GERD - on pepcid as needed     6. secondary hyperparathyroidism - PTH normal range, 76.1 as of 5/7/24, monitor this     7. DM2, diet controlled - better controlled, A1C 5.9 as of 5/7/24 and stable.     8. Vitamin D deficiency - completed ergocalciferol course. Now on vitamin D3 5000u daily, repeat 25(OH) vitamin D level 20 as of 11/25/20. Monitor vitamin D level.      9. Right kidney cyst - 1.4cm in size on CT abdomen from Nov. 2020, previously 1cm in May 2017.      RTC in 6 months. Obtain blood testing and urine  testing prior to next office visit.       Annette was seen today for chronic kidney disease and follow-up.    Diagnoses and all orders for this visit:    Stage 3b chronic kidney disease (HCC)  -     Basic metabolic panel; Future  -     Urinalysis with microscopic; Future  -     Protein / creatinine ratio, urine; Future  -     Albumin / creatinine urine ratio; Future    Benign essential hypertension    Secondary hyperparathyroidism of renal origin (HCC)    Idiopathic gout, unspecified chronicity, unspecified site    Obesity, morbid (HCC)    Other proteinuria  -     Protein / creatinine ratio, urine; Future  -     Albumin / creatinine urine ratio; Future    Type 2 diabetes mellitus with stage 3b chronic kidney disease, without long-term current use of insulin ([facility])        Assessment/Plan:  1. CKD stage 3b A1 likely DM +/- HTN - baseline 1.4-1.5 with most recent sCr 1.45 as of 5/7/24, at baseline, eGFR 38 ml/min  -maintain adequate BP and BS control to slow progression of CKD  -UA with microscopy shows trace leukocytes, trace protein with rare RBCs  -renal u/s shows mild right sided cortical atrophy. No stones  -UA shows leukocyte esterase with few bacteria without nitrites as of 6/16/21, negative for protein  -UpCr 0.14g  -continue to avoid NSAIDs(aleve, ibuprofen, naproxen, advil, motrin) as this can worsen kidney disease  -repeat BMP prior to next appointment     2. HTN - BP well controlled for age and level of CKD in office but higher at home  -Off dyazide since late March 2017 due to rise in sCr. Will need to monitor Cr closely.   -Has been on amlodipine 5 mg daily, inderal LA 120mg daily, losartan 50mg daily  -call the office with BP log and heart rates for a week.   -off HCTZ  -PRA 97, aldosterone 19.4, renin 0.2  -repeat aldosterone 23.2, repeat renin 0.3 - consider endo consultation if BP rises     3. proteinuria - stable  -monitor urine protein to creatinine ratio     4. gout - uric acid improved. No recent  "flares. Will monitor.     5. GERD - on pepcid as needed     6. secondary hyperparathyroidism - PTH normal range, 76.1 as of 5/7/24, monitor this     7. DM2, diet controlled - better controlled, A1C 5.9 as of 5/7/24 and stable.     8. Vitamin D deficiency - completed ergocalciferol course. Now on vitamin D3 5000u daily, repeat 25(OH) vitamin D level 20 as of 11/25/20. Monitor vitamin D level.      9. Right kidney cyst - 1.4cm in size on CT abdomen from Nov. 2020, previously 1cm in May 2017.      RTC in 6 months. Obtain blood testing and urine testing prior to next office visit.     SUBJECTIVE / INTERVAL HISTORY:  74 y.o. female presents in follow up of CKD.     Christina Marte went into arrythmia s/p covid vaccine. Had PCVs and PACs. Inderal and losartan doses increased. Still has some arrythmia but usually does not feel it.    Denies NSAID use. Uses tylenol as needed.  Denies recent gout flares.  HTN - BP at home has been a little higher, average 143/74.     Review of Systems   Constitutional:  Negative for chills and fever.   HENT:  Negative for sore throat and trouble swallowing.    Eyes:  Negative for visual disturbance.   Respiratory:  Negative for cough and shortness of breath.    Cardiovascular:  Negative for chest pain and leg swelling.   Gastrointestinal:  Negative for abdominal pain, constipation, diarrhea, nausea and vomiting.   Endocrine: Negative for polyuria.   Genitourinary:  Negative for difficulty urinating, dysuria and hematuria.   Musculoskeletal:  Positive for back pain (from spinal stenosis). Negative for neck pain.   Skin:  Positive for rash (from psoriasis).   Neurological:  Positive for dizziness (when standing up fast, from [place]). Negative for light-headedness and numbness.   Psychiatric/Behavioral:  The patient is not nervous/anxious.        OBJECTIVE:  /64 (BP Location: Left arm, Patient Position: Sitting, Cuff Size: Standard)   Pulse 63   Ht 5' 5\" (1.651 m)   Wt 99.8 kg (220 " lb)   BMI 36.61 kg/m²  Body mass index is 36.61 kg/m².    Physical exam:  Physical Exam  Vitals and nursing note reviewed.   Constitutional:       General: She is not in acute distress.     Appearance: Normal appearance. She is well-developed. She is obese. She is not diaphoretic.   HENT:      Head: Normocephalic and atraumatic.      Nose: Nose normal.      Mouth/Throat:      Mouth: Mucous membranes are dry.      Pharynx: No oropharyngeal exudate.   Eyes:      General: No scleral icterus.        Right eye: No discharge.         Left eye: No discharge.      Comments: eyeglasses   Neck:      Thyroid: No thyromegaly.   Cardiovascular:      Rate and Rhythm: Normal rate and regular rhythm.      Heart sounds: No murmur heard.  Pulmonary:      Effort: Pulmonary effort is normal. No respiratory distress.      Breath sounds: Normal breath sounds. No wheezing.   Abdominal:      General: Bowel sounds are normal. There is no distension.      Palpations: Abdomen is soft.   Musculoskeletal:         General: No swelling. Normal range of motion.      Cervical back: Normal range of motion and neck supple.   Skin:     General: Skin is warm and dry.      Findings: No rash.   Neurological:      General: No focal deficit present.      Mental Status: She is alert.      Motor: No abnormal muscle tone.      Comments: awake   Psychiatric:         Mood and Affect: Mood normal.         Behavior: Behavior normal.         Medications:    Current Outpatient Medications:     amLODIPine (Norvasc) 5 mg tablet, , Disp: , Rfl:     aspirin 81 mg chewable tablet, Chew 81 mg daily, Disp: , Rfl:     bimatoprost (LUMIGAN) 0.01 % ophthalmic drops, Administer 1 drop to both eyes daily at bedtime, Disp: , Rfl:     Calcium Carbonate 1500 (600 Ca) MG TABS, One daily, Disp: , Rfl:     cholecalciferol (VITAMIN D3) 1,000 units tablet, Take 2 tablets (2,000 Units total) by mouth daily (Patient taking differently: Take 5,000 Units by mouth daily), Disp: , Rfl:      "clobetasol (TEMOVATE) 0.05 % cream, APPLY THIN COAT TO AFFECTED AREA TWICE A DAY, Disp: , Rfl:     ezetimibe (ZETIA) 10 mg tablet, , Disp: , Rfl:     famotidine (PEPCID) 20 mg tablet, Take 1 tablet by mouth daily as needed  , Disp: , Rfl:     LORazepam (ATIVAN) 0.5 mg tablet, Take 1 tablet by mouth daily as needed, Disp: , Rfl:     losartan (COZAAR) 25 mg tablet, Take 1 tablet (25 mg total) by mouth daily (Patient taking differently: Take 50 mg by mouth daily), Disp: , Rfl:     propranolol (INDERAL) 80 mg tablet, Take 120 mg by mouth daily, Disp: , Rfl:     vitamin B-12 (VITAMIN B-12) 1,000 mcg tablet, Take 1 tablet (1,000 mcg total) by mouth daily, Disp: , Rfl:     anastrozole (ARIMIDEX) 1 mg tablet, Take 1 mg by mouth daily, Disp: , Rfl:     Allergies:  Allergies as of 05/09/2024 - Reviewed 05/09/2024   Allergen Reaction Noted    Codeine Other (See Comments)     Erythromycin  05/02/2017    Miconazole  05/02/2017       The following portions of the patient's history were reviewed and updated as appropriate: past family history, past surgical history and problem list.    Laboratory Results:  Lab Results   Component Value Date    SODIUM 141 05/07/2024    K 4.0 05/07/2024     05/07/2024    CO2 27 05/07/2024    BUN 17 05/07/2024    CREATININE 1.45 (H) 05/07/2024    GLUC 133 (H) 05/07/2024    CALCIUM 9.2 05/07/2024        Lab Results   Component Value Date    PTH 86.6 01/17/2022    CALCIUM 9.2 05/07/2024    PHOS 3.9 02/10/2023       Portions of the record may have been created with voice recognition software.  Occasional wrong word or \"sound a like\" substitutions may have occurred due to the inherent limitations of voice recognition software.  Read the chart carefully and recognize, using context, where substitutions have occurred.  "

## 2024-06-12 ENCOUNTER — TELEPHONE (OUTPATIENT)
Dept: NEPHROLOGY | Facility: CLINIC | Age: 74
End: 2024-06-12

## 2024-11-22 ENCOUNTER — TELEPHONE (OUTPATIENT)
Dept: NEPHROLOGY | Facility: CLINIC | Age: 74
End: 2024-11-22

## 2024-11-22 NOTE — TELEPHONE ENCOUNTER
LM for pt reminder call - labs due to be completed prior to upcoming appt - asked pt contact office with any questions in regard to this message

## 2024-11-25 LAB
ALBUMIN/CREAT UR: 10.8
ANION GAP SERPL CALCULATED.3IONS-SCNC: 11 MMOL/L (ref 3–11)
BUN SERPL-MCNC: 13 MG/DL (ref 7–25)
CALCIUM SERPL-MCNC: 9.5 MG/DL (ref 8.5–10.5)
CHLORIDE SERPL-SCNC: 102 MMOL/L (ref 100–109)
CO2 SERPL-SCNC: 26 MMOL/L (ref 21–31)
CREAT SERPL-MCNC: 1.32 MG/DL (ref 0.4–1.1)
CREAT UR-MCNC: 184.9 MG/DL (ref 50–200)
CREAT UR-MCNC: 185.9 MG/DL (ref 50–200)
CYTOLOGY CMNT CVX/VAG CYTO-IMP: ABNORMAL
GFR/BSA.PRED SERPLBLD CYS-BASED-ARV: 42 ML/MIN/{1.73_M2}
GLUCOSE SERPL-MCNC: 128 MG/DL (ref 65–99)
MICROALBUMIN UR-MCNC: 2 MG/DL
POTASSIUM SERPL-SCNC: 4.5 MMOL/L (ref 3.5–5.2)
PROT UR-MCNC: 23.4 MG/DL
PROT/CREAT UR: 0.13
SODIUM SERPL-SCNC: 139 MMOL/L (ref 135–145)

## 2024-11-26 LAB
BACTERIA URNS QL MICRO: ABNORMAL
CAOX CRY #/AREA URNS HPF: PRESENT /[HPF]
GLUCOSE UR QL STRIP: NEGATIVE MG/DL
HGB UR QL STRIP: NEGATIVE MG/DL
KETONES UR QL STRIP: NEGATIVE MG/DL
LEUKOCYTE ESTERASE UR QL STRIP: 500 /UL
MUCOUS THREADS URNS QL MICRO: ABNORMAL
NITRITE UR QL STRIP: NEGATIVE
PH UR: 5 [PH] (ref 4.5–8)
PROT 24H UR-MRATE: NEGATIVE MG/DL
RBC #/AREA URNS HPF: ABNORMAL /HPF (ref 0–2)
SL AMB POCT URINE COMMENT: ABNORMAL
SP GR UR: 1.02 (ref 1–1.03)
SQUAMOUS #/AREA URNS HPF: >10 /LPF (ref 0–5)
WBC #/AREA URNS HPF: ABNORMAL /HPF (ref 0–5)

## 2024-11-27 ENCOUNTER — OFFICE VISIT (OUTPATIENT)
Dept: NEPHROLOGY | Facility: CLINIC | Age: 74
End: 2024-11-27
Payer: COMMERCIAL

## 2024-11-27 VITALS
DIASTOLIC BLOOD PRESSURE: 60 MMHG | WEIGHT: 225 LBS | HEART RATE: 58 BPM | HEIGHT: 65 IN | SYSTOLIC BLOOD PRESSURE: 140 MMHG | OXYGEN SATURATION: 97 % | BODY MASS INDEX: 37.49 KG/M2

## 2024-11-27 DIAGNOSIS — I10 BENIGN ESSENTIAL HYPERTENSION: ICD-10-CM

## 2024-11-27 DIAGNOSIS — N25.81 SECONDARY HYPERPARATHYROIDISM OF RENAL ORIGIN (HCC): ICD-10-CM

## 2024-11-27 DIAGNOSIS — R80.8 OTHER PROTEINURIA: ICD-10-CM

## 2024-11-27 DIAGNOSIS — N18.32 TYPE 2 DIABETES MELLITUS WITH STAGE 3B CHRONIC KIDNEY DISEASE, WITHOUT LONG-TERM CURRENT USE OF INSULIN (HCC): Primary | ICD-10-CM

## 2024-11-27 DIAGNOSIS — E11.22 TYPE 2 DIABETES MELLITUS WITH STAGE 3B CHRONIC KIDNEY DISEASE, WITHOUT LONG-TERM CURRENT USE OF INSULIN (HCC): Primary | ICD-10-CM

## 2024-11-27 PROCEDURE — 99214 OFFICE O/P EST MOD 30 MIN: CPT | Performed by: INTERNAL MEDICINE

## 2024-11-27 NOTE — ASSESSMENT & PLAN NOTE
- BP well controlled for age and level of CKD in office but higher at home at times  -Off dyazide since late March 2017 due to rise in sCr. Will need to monitor Cr closely.   -Has been on amlodipine 5 mg daily, inderal LA 120mg daily, losartan 25mg daily  -call the office with BP log and heart rates for a week.   -off HCTZ  -PRA 97, aldosterone 19.4, renin 0.2  -repeat aldosterone 23.2, repeat renin 0.3 - consider endo consultation if BP rises  -goal < 150/90

## 2024-11-27 NOTE — ASSESSMENT & PLAN NOTE
CKD stage 3b A1 likely DM +/- HTN - baseline 1.4-1.5 with most recent sCr 1.32 as of 11/25/24, at baseline, eGFR 42 ml/min  -maintain adequate BP and BS control to slow progression of CKD  -UA with microscopy shows trace leukocytes, trace protein with 6-10 WBCs, +calcium oxalate crystals  -renal u/s shows mild right sided cortical atrophy. No stones  -UA shows leukocyte esterase with few bacteria without nitrites as of 6/16/21, negative for protein  -UpCr 0.13g  -continue to avoid NSAIDs(aleve, ibuprofen, naproxen, advil, motrin) as this can worsen kidney disease  -repeat BMP prior to next appointment    DM2 - better controlled, A1C 5.9 as of 5/7/24 and stable.

## 2024-11-27 NOTE — PATIENT INSTRUCTIONS
Type 2 diabetes mellitus with stage 3b chronic kidney disease, without long-term current use of insulin ([facility])  CKD stage 3b A1 likely DM +/- HTN - baseline 1.4-1.5 with most recent sCr 1.32 as of 11/25/24, at baseline, eGFR 42 ml/min  -maintain adequate BP and BS control to slow progression of CKD  -UA with microscopy shows trace leukocytes, trace protein with 6-10 WBCs, +calcium oxalate crystals  -renal u/s shows mild right sided cortical atrophy. No stones  -UA shows leukocyte esterase with few bacteria without nitrites as of 6/16/21, negative for protein  -UpCr 0.13g  -continue to avoid NSAIDs(aleve, ibuprofen, naproxen, advil, motrin) as this can worsen kidney disease  -repeat BMP prior to next appointment    DM2 - better controlled, A1C 5.9 as of 5/7/24 and stable.     Benign essential hypertension  - BP well controlled for age and level of CKD in office but higher at home at times  -Off dyazide since late March 2017 due to rise in sCr. Will need to monitor Cr closely.   -Has been on amlodipine 5 mg daily, inderal LA 120mg daily, losartan 25mg daily  -call the office with BP log and heart rates for a week.   -off HCTZ  -PRA 97, aldosterone 19.4, renin 0.2  -repeat aldosterone 23.2, repeat renin 0.3 - consider endo consultation if BP rises  -goal < 150/90  Secondary hyperparathyroidism of renal origin (HCC)  - PTH normal range, 76.1 as of 5/7/24, monitor this   Other proteinuria  -negligible as above    RTC in 12 months.  Obtain blood and urine testing every 6 months.

## 2024-11-27 NOTE — PROGRESS NOTES
NEPHROLOGY OUTPATIENT PROGRESS NOTE   Christina Marte 74 y.o. female MRN: [id number]  DATE: 11/27/2024  Reason for visit:   Chief Complaint   Patient presents with    Follow-up    Chronic Kidney Disease        Patient Instructions   Type 2 diabetes mellitus with stage 3b chronic kidney disease, without long-term current use of insulin (HCC)  CKD stage 3b A1 likely DM +/- HTN - baseline 1.4-1.5 with most recent sCr 1.32 as of 11/25/24, at baseline, eGFR 42 ml/min  -maintain adequate BP and BS control to slow progression of CKD  -UA with microscopy shows trace leukocytes, trace protein with 6-10 WBCs, +calcium oxalate crystals  -renal u/s shows mild right sided cortical atrophy. No stones  -UA shows leukocyte esterase with few bacteria without nitrites as of 6/16/21, negative for protein  -UpCr 0.13g  -continue to avoid NSAIDs(aleve, ibuprofen, naproxen, advil, motrin) as this can worsen kidney disease  -repeat BMP prior to next appointment    DM2 - better controlled, A1C 5.9 as of 5/7/24 and stable.     Benign essential hypertension  - BP well controlled for age and level of CKD in office but higher at home at times  -Off dyazide since late March 2017 due to rise in sCr. Will need to monitor Cr closely.   -Has been on amlodipine 5 mg daily, inderal LA 120mg daily, losartan 25mg daily  -call the office with BP log and heart rates for a week.   -off HCTZ  -PRA 97, aldosterone 19.4, renin 0.2  -repeat aldosterone 23.2, repeat renin 0.3 - consider endo consultation if BP rises  -goal < 150/90  Secondary hyperparathyroidism of renal origin (HCC)  - PTH normal range, 76.1 as of 5/7/24, monitor this   Other proteinuria  -negligible as above    RTC in 12 months.  Obtain blood and urine testing every 6 months.      Assessment & Plan  Type 2 diabetes mellitus with stage 3b chronic kidney disease, without long-term current use of insulin (HCC)  CKD stage 3b A1 likely DM +/- HTN - baseline 1.4-1.5 with most recent sCr 1.32 as  of 11/25/24, at baseline, eGFR 42 ml/min  -maintain adequate BP and BS control to slow progression of CKD  -UA with microscopy shows trace leukocytes, trace protein with 6-10 WBCs, +calcium oxalate crystals  -renal u/s shows mild right sided cortical atrophy. No stones  -UA shows leukocyte esterase with few bacteria without nitrites as of 6/16/21, negative for protein  -UpCr 0.13g  -continue to avoid NSAIDs(aleve, ibuprofen, naproxen, advil, motrin) as this can worsen kidney disease  -repeat BMP prior to next appointment    DM2 - better controlled, A1C 5.9 as of 5/7/24 and stable.     Benign essential hypertension  - BP well controlled for age and level of CKD in office but higher at home at times  -Off dyazide since late March 2017 due to rise in sCr. Will need to monitor Cr closely.   -Has been on amlodipine 5 mg daily, inderal LA 120mg daily, losartan 25mg daily  -call the office with BP log and heart rates for a week.   -off HCTZ  -PRA 97, aldosterone 19.4, renin 0.2  -repeat aldosterone 23.2, repeat renin 0.3 - consider endo consultation if BP rises  -goal < 150/90  Secondary hyperparathyroidism of renal origin (HCC)  - PTH normal range, 76.1 as of 5/7/24, monitor this   Other proteinuria  -negligible as above    RTC in 12 months.  Obtain blood and urine testing every 6 months.    SUBJECTIVE / INTERVAL HISTORY:  74 y.o. female presents in follow up of CKD.     Christina Rosanna denies any recent illness/hospitalizations/medication changes since last office visit.    Denies NSAID use.  HTN - BP has been a little higher  Blood sugars are good  Has sharp pains in hands, wrists and knees. Is going to stop anastrozole.     Review of Systems   Constitutional:  Negative for chills and fever.   HENT:  Negative for sore throat and trouble swallowing.    Eyes:  Negative for visual disturbance.   Respiratory:  Negative for cough and shortness of breath.    Cardiovascular:  Negative for chest pain and leg swelling.  "  Gastrointestinal:  Negative for abdominal pain, constipation, diarrhea, nausea and vomiting.   Endocrine: Negative for polyuria.   Genitourinary:  Negative for difficulty urinating, dysuria and hematuria.   Musculoskeletal:  Negative for back pain and neck pain.        Joint pains   Skin:  Positive for rash (over hands and back).   Neurological:  Negative for dizziness, light-headedness and numbness.   Psychiatric/Behavioral:  The patient is not nervous/anxious.        OBJECTIVE:  /60 (BP Location: Left arm, Patient Position: Sitting, Cuff Size: Adult)   Pulse 58   Ht 5' 5\" (1.651 m)   Wt 102 kg (225 lb)   SpO2 97%   BMI 37.44 kg/m²  Body mass index is 37.44 kg/m².    Physical exam:  Physical Exam  Vitals and nursing note reviewed.   Constitutional:       General: She is not in acute distress.     Appearance: Normal appearance. She is well-developed. She is obese. She is not diaphoretic.   HENT:      Head: Normocephalic and atraumatic.      Nose: Nose normal.      Mouth/Throat:      Mouth: Mucous membranes are moist.      Pharynx: No oropharyngeal exudate.   Eyes:      General: No scleral icterus.        Right eye: No discharge.         Left eye: No discharge.      Comments: eyeglasses   Neck:      Thyroid: No thyromegaly.   Cardiovascular:      Rate and Rhythm: Normal rate and regular rhythm.      Heart sounds: No murmur heard.  Pulmonary:      Effort: Pulmonary effort is normal. No respiratory distress.      Breath sounds: Normal breath sounds. No wheezing.   Abdominal:      General: Bowel sounds are normal. There is no distension.      Palpations: Abdomen is soft.   Musculoskeletal:         General: No swelling. Normal range of motion.      Cervical back: Normal range of motion and neck supple.   Skin:     General: Skin is warm and dry.      Coloration: Skin is not jaundiced.      Findings: No rash.      Comments: Left hand rash   Neurological:      General: No focal deficit present.      Mental " Status: She is alert.      Motor: No abnormal muscle tone.      Comments: awake   Psychiatric:         Mood and Affect: Mood normal.         Behavior: Behavior normal.         Medications:    Current Outpatient Medications:     amLODIPine (Norvasc) 5 mg tablet, , Disp: , Rfl:     anastrozole (ARIMIDEX) 1 mg tablet, Take 1 mg by mouth daily, Disp: , Rfl:     aspirin 81 mg chewable tablet, Chew 81 mg daily, Disp: , Rfl:     bimatoprost (LUMIGAN) 0.01 % ophthalmic drops, Administer 1 drop to both eyes daily at bedtime, Disp: , Rfl:     Calcium Carbonate 1500 (600 Ca) MG TABS, One daily, Disp: , Rfl:     cholecalciferol (VITAMIN D3) 1,000 units tablet, Take 2 tablets (2,000 Units total) by mouth daily, Disp: , Rfl:     clobetasol (TEMOVATE) 0.05 % cream, APPLY THIN COAT TO AFFECTED AREA TWICE A DAY, Disp: , Rfl:     ezetimibe (ZETIA) 10 mg tablet, , Disp: , Rfl:     famotidine (PEPCID) 20 mg tablet, Take 1 tablet by mouth daily as needed  , Disp: , Rfl:     LORazepam (ATIVAN) 0.5 mg tablet, Take 1 tablet by mouth daily as needed, Disp: , Rfl:     losartan (COZAAR) 25 mg tablet, Take 1 tablet (25 mg total) by mouth daily, Disp: , Rfl:     propranolol (INDERAL) 80 mg tablet, Take 120 mg by mouth daily, Disp: , Rfl:     vitamin B-12 (VITAMIN B-12) 1,000 mcg tablet, Take 1 tablet (1,000 mcg total) by mouth daily, Disp: , Rfl:     Allergies:  Allergies as of 11/27/2024 - Reviewed 11/27/2024   Allergen Reaction Noted    Codeine Other (See Comments)     Erythromycin  05/02/2017    Miconazole  05/02/2017       The following portions of the patient's history were reviewed and updated as appropriate: past family history, past surgical history and problem list.    Laboratory Results:  Lab Results   Component Value Date    SODIUM 139 11/25/2024    K 4.5 11/25/2024     11/25/2024    CO2 26 11/25/2024    BUN 13 11/25/2024    CREATININE 1.32 (H) 11/25/2024    GLUC 128 (H) 11/25/2024    CALCIUM 9.5 11/25/2024        Lab Results  "  Component Value Date    PTH 86.6 01/17/2022    CALCIUM 9.5 11/25/2024    PHOS 3.9 02/10/2023       Portions of the record may have been created with voice recognition software.  Occasional wrong word or \"sound a like\" substitutions may have occurred due to the inherent limitations of voice recognition software.  Read the chart carefully and recognize, using context, where substitutions have occurred.  "

## 2025-01-10 DIAGNOSIS — Z00.6 ENCOUNTER FOR EXAMINATION FOR NORMAL COMPARISON OR CONTROL IN CLINICAL RESEARCH PROGRAM: ICD-10-CM

## 2025-01-12 ENCOUNTER — APPOINTMENT (OUTPATIENT)
Dept: LAB | Facility: CLINIC | Age: 75
End: 2025-01-12

## 2025-01-12 DIAGNOSIS — Z00.6 ENCOUNTER FOR EXAMINATION FOR NORMAL COMPARISON OR CONTROL IN CLINICAL RESEARCH PROGRAM: ICD-10-CM

## 2025-01-12 PROCEDURE — 36415 COLL VENOUS BLD VENIPUNCTURE: CPT

## 2025-01-21 LAB
APOB+LDLR+PCSK9 GENE MUT ANL BLD/T: NOT DETECTED
BRCA1+BRCA2 DEL+DUP + FULL MUT ANL BLD/T: NOT DETECTED
MLH1+MSH2+MSH6+PMS2 GN DEL+DUP+FUL M: NOT DETECTED

## 2025-01-28 ENCOUNTER — PATIENT MESSAGE (OUTPATIENT)
Dept: NEPHROLOGY | Facility: CLINIC | Age: 75
End: 2025-01-28

## 2025-01-28 DIAGNOSIS — I10 BENIGN ESSENTIAL HYPERTENSION: ICD-10-CM

## 2025-01-28 DIAGNOSIS — N18.32 TYPE 2 DIABETES MELLITUS WITH STAGE 3B CHRONIC KIDNEY DISEASE, WITHOUT LONG-TERM CURRENT USE OF INSULIN (HCC): Primary | ICD-10-CM

## 2025-01-28 DIAGNOSIS — N25.81 SECONDARY HYPERPARATHYROIDISM OF RENAL ORIGIN (HCC): ICD-10-CM

## 2025-01-28 DIAGNOSIS — N18.32 STAGE 3B CHRONIC KIDNEY DISEASE (HCC): ICD-10-CM

## 2025-01-28 DIAGNOSIS — E11.22 TYPE 2 DIABETES MELLITUS WITH STAGE 3B CHRONIC KIDNEY DISEASE, WITHOUT LONG-TERM CURRENT USE OF INSULIN (HCC): Primary | ICD-10-CM

## 2025-01-29 ENCOUNTER — TELEPHONE (OUTPATIENT)
Dept: NEPHROLOGY | Facility: CLINIC | Age: 75
End: 2025-01-29

## 2025-04-07 LAB
ALBUMIN/CREAT UR: 65.2
ANION GAP SERPL CALCULATED.3IONS-SCNC: 9 MMOL/L (ref 3–11)
BUN SERPL-MCNC: 12 MG/DL (ref 7–25)
CALCIUM SERPL-MCNC: 9 MG/DL (ref 8.5–10.5)
CHLORIDE SERPL-SCNC: 106 MMOL/L (ref 100–109)
CO2 SERPL-SCNC: 26 MMOL/L (ref 21–31)
CREAT SERPL-MCNC: 1.37 MG/DL (ref 0.4–1.1)
CREAT UR-MCNC: 18.1 MG/DL (ref 50–200)
CREAT UR-MCNC: 18.4 MG/DL (ref 50–200)
CYTOLOGY CMNT CVX/VAG CYTO-IMP: ABNORMAL
GFR/BSA.PRED SERPLBLD CYS-BASED-ARV: 40 ML/MIN/{1.73_M2}
GLUCOSE SERPL-MCNC: 144 MG/DL (ref 65–99)
GLUCOSE UR QL STRIP: NEGATIVE MG/DL
HGB UR QL STRIP: NEGATIVE MG/DL
KETONES UR QL STRIP: NEGATIVE MG/DL
LEUKOCYTE ESTERASE UR QL STRIP: NEGATIVE /UL
MICROALBUMIN UR-MCNC: 1.2 MG/DL
NITRITE UR QL STRIP: NEGATIVE
PH UR: 7 [PH] (ref 4.5–8)
POTASSIUM SERPL-SCNC: 3.9 MMOL/L (ref 3.5–5.2)
PROT 24H UR-MRATE: NEGATIVE MG/DL
PROT UR-MCNC: 4.6 MG/DL
PROT/CREAT UR: 0.25
RBC #/AREA URNS HPF: 0 /HPF (ref 0–2)
SL AMB POCT URINE COMMENT: NORMAL
SODIUM SERPL-SCNC: 141 MMOL/L (ref 135–145)
SP GR UR: 1 (ref 1–1.03)
SQUAMOUS #/AREA URNS HPF: NORMAL /LPF (ref 0–5)
TRANS CELLS #/AREA URNS HPF: NORMAL /LPF (ref 0–1)
WBC #/AREA URNS HPF: NORMAL /HPF (ref 0–5)

## 2025-04-08 ENCOUNTER — RESULTS FOLLOW-UP (OUTPATIENT)
Dept: NEPHROLOGY | Facility: HOSPITAL | Age: 75
End: 2025-04-08

## 2025-04-08 LAB — PTH-INTACT SERPL-MCNC: 46.1 PG/ML (ref 12–88)

## 2025-04-09 ENCOUNTER — OFFICE VISIT (OUTPATIENT)
Dept: NEPHROLOGY | Facility: CLINIC | Age: 75
End: 2025-04-09

## 2025-04-09 VITALS
BODY MASS INDEX: 33.95 KG/M2 | OXYGEN SATURATION: 98 % | DIASTOLIC BLOOD PRESSURE: 68 MMHG | HEART RATE: 63 BPM | SYSTOLIC BLOOD PRESSURE: 128 MMHG | WEIGHT: 204 LBS

## 2025-04-09 DIAGNOSIS — I48.0 PAROXYSMAL ATRIAL FIBRILLATION (HCC): ICD-10-CM

## 2025-04-09 DIAGNOSIS — N25.81 SECONDARY HYPERPARATHYROIDISM OF RENAL ORIGIN (HCC): ICD-10-CM

## 2025-04-09 DIAGNOSIS — I10 BENIGN ESSENTIAL HYPERTENSION: ICD-10-CM

## 2025-04-09 DIAGNOSIS — E66.01 OBESITY, MORBID (HCC): ICD-10-CM

## 2025-04-09 DIAGNOSIS — E11.22 TYPE 2 DIABETES MELLITUS WITH STAGE 3B CHRONIC KIDNEY DISEASE, WITHOUT LONG-TERM CURRENT USE OF INSULIN (HCC): Primary | ICD-10-CM

## 2025-04-09 DIAGNOSIS — R80.8 OTHER PROTEINURIA: ICD-10-CM

## 2025-04-09 DIAGNOSIS — N18.32 TYPE 2 DIABETES MELLITUS WITH STAGE 3B CHRONIC KIDNEY DISEASE, WITHOUT LONG-TERM CURRENT USE OF INSULIN (HCC): Primary | ICD-10-CM

## 2025-04-09 RX ORDER — LETROZOLE 2.5 MG/1
2.5 TABLET, FILM COATED ORAL DAILY
COMMUNITY
Start: 2025-03-18

## 2025-04-09 RX ORDER — METOPROLOL SUCCINATE 50 MG/1
50 TABLET, EXTENDED RELEASE ORAL 2 TIMES DAILY
COMMUNITY
Start: 2025-02-10 | End: 2026-02-10

## 2025-04-09 NOTE — ASSESSMENT & PLAN NOTE
- BP well controlled for age and level of CKD in office   -Off dyazide since late March 2017 due to rise in sCr. Will need to monitor Cr closely.   -Has been on amlodipine 5 mg daily, metoprolol 50mg twice daily, losartan 50mg daily   -off HCTZ  -PRA 97, aldosterone 19.4, renin 0.2  -repeat aldosterone 23.2, repeat renin 0.3 - consider endo consultation if BP rises  -goal < 150/90, at goal

## 2025-04-09 NOTE — ASSESSMENT & PLAN NOTE
CKD stage 3b A1 likely DM +/- HTN - baseline 1.4-1.5 with most recent sCr 1.37 as of 4/7/25, at baseline, eGFR 40 ml/min  -maintain adequate BP and BS control to slow progression of CKD  -renal u/s shows mild right sided cortical atrophy. No stones  -UA bland as of 4/7/25  -UpCr 0.25g, slightly higher, on losartan 50mg daily  -continue to avoid NSAIDs(aleve, ibuprofen, naproxen, advil, motrin) as this can worsen kidney disease  -repeat BMP in 2 weeks to monitor K/sCr levels on ARB  -A1C 6.1 as of 12/26/24 and stable.

## 2025-04-09 NOTE — PATIENT INSTRUCTIONS
Type 2 diabetes mellitus with stage 3b chronic kidney disease, without long-term current use of insulin (HCC)  CKD stage 3b A1 likely DM +/- HTN - baseline 1.4-1.5 with most recent sCr 1.37 as of 4/7/25, at baseline, eGFR 40 ml/min  -maintain adequate BP and BS control to slow progression of CKD  -renal u/s shows mild right sided cortical atrophy. No stones  -UA bland as of 4/7/25  -UpCr 0.25g, slightly higher, on losartan 50mg daily  -continue to avoid NSAIDs(aleve, ibuprofen, naproxen, advil, motrin) as this can worsen kidney disease  -repeat BMP in 2 weeks to monitor K/sCr levels on ARB  -A1C 6.1 as of 12/26/24 and stable.     Benign essential hypertension  - BP well controlled for age and level of CKD in office   -Off dyazide since late March 2017 due to rise in sCr. Will need to monitor Cr closely.   -Has been on amlodipine 5 mg daily, metoprolol 50mg twice daily, losartan 50mg daily   -off HCTZ  -PRA 97, aldosterone 19.4, renin 0.2  -repeat aldosterone 23.2, repeat renin 0.3 - consider endo consultation if BP rises  -goal < 150/90, at goal  Secondary hyperparathyroidism of renal origin (HCC)  - PTH normal range, 46.1 as of 4/7/25, resolved  Other proteinuria  -microalb:Cr 65.2 as of 4/7/25, monitor this  -on losartan 50mg daily, dose recently increased   Paroxysmal atrial fibrillation (HCC)  -on eliquis and metoprolol  -for ablation per [facility] cardiology  Obesity, morbid (HCC)  -encourage weight loss/exercise    RTC in 6 months.  Obtain blood and urine testing in 3 months and again in 6 months.

## 2025-04-09 NOTE — PROGRESS NOTES
NEPHROLOGY OUTPATIENT PROGRESS NOTE   Christina Marte 75 y.o. female MRN: [id number]  DATE: 4/9/2025  Reason for visit:   Chief Complaint   Patient presents with    Hospital Follow-up    Chronic Kidney Disease        Patient Instructions   Type 2 diabetes mellitus with stage 3b chronic kidney disease, without long-term current use of insulin (HCC)  CKD stage 3b A1 likely DM +/- HTN - baseline 1.4-1.5 with most recent sCr 1.37 as of 4/7/25, at baseline, eGFR 40 ml/min  -maintain adequate BP and BS control to slow progression of CKD  -renal u/s shows mild right sided cortical atrophy. No stones  -UA bland as of 4/7/25  -UpCr 0.25g, slightly higher, on losartan 50mg daily  -continue to avoid NSAIDs(aleve, ibuprofen, naproxen, advil, motrin) as this can worsen kidney disease  -repeat BMP in 2 weeks to monitor K/sCr levels on ARB  -A1C 6.1 as of 12/26/24 and stable.     Benign essential hypertension  - BP well controlled for age and level of CKD in office   -Off dyazide since late March 2017 due to rise in sCr. Will need to monitor Cr closely.   -Has been on amlodipine 5 mg daily, metoprolol 50mg twice daily, losartan 50mg daily   -off HCTZ  -PRA 97, aldosterone 19.4, renin 0.2  -repeat aldosterone 23.2, repeat renin 0.3 - consider endo consultation if BP rises  -goal < 150/90, at goal  Secondary hyperparathyroidism of renal origin (HCC)  - PTH normal range, 46.1 as of 4/7/25, resolved  Other proteinuria  -microalb:Cr 65.2 as of 4/7/25, monitor this  -on losartan 50mg daily, dose recently increased   Paroxysmal atrial fibrillation (HCC)  -on eliquis and metoprolol  -for ablation per [facility] cardiology  Obesity, morbid (HCC)  -encourage weight loss/exercise    RTC in 6 months.  Obtain blood and urine testing in 3 months and again in 6 months.      Assessment & Plan  Type 2 diabetes mellitus with stage 3b chronic kidney disease, without long-term current use of insulin (HCC)  CKD stage 3b A1 likely DM +/- HTN - baseline  1.4-1.5 with most recent sCr 1.37 as of 4/7/25, at baseline, eGFR 40 ml/min  -maintain adequate BP and BS control to slow progression of CKD  -renal u/s shows mild right sided cortical atrophy. No stones  -UA bland as of 4/7/25  -UpCr 0.25g, slightly higher, on losartan 50mg daily  -continue to avoid NSAIDs(aleve, ibuprofen, naproxen, advil, motrin) as this can worsen kidney disease  -repeat BMP in 2 weeks to monitor K/sCr levels on ARB  -A1C 6.1 as of 12/26/24 and stable.     Benign essential hypertension  - BP well controlled for age and level of CKD in office   -Off dyazide since late March 2017 due to rise in sCr. Will need to monitor Cr closely.   -Has been on amlodipine 5 mg daily, metoprolol 50mg twice daily, losartan 50mg daily   -off HCTZ  -PRA 97, aldosterone 19.4, renin 0.2  -repeat aldosterone 23.2, repeat renin 0.3 - consider endo consultation if BP rises  -goal < 150/90, at goal  Secondary hyperparathyroidism of renal origin (HCC)  - PTH normal range, 46.1 as of 4/7/25, resolved  Other proteinuria  -microalb:Cr 65.2 as of 4/7/25, monitor this  -on losartan 50mg daily, dose recently increased   Paroxysmal atrial fibrillation (HCC)  -on eliquis and metoprolol  -for ablation per [facility] cardiology  Obesity, morbid (HCC)  -encourage weight loss/exercise    RTC in 6 months.  Obtain blood and urine testing in 3 months and again in 6 months.    SUBJECTIVE / INTERVAL HISTORY:  75 y.o. female presents in hospital follow up of CKD.     Christina Marte went to the [facility] ER 3/2/25 for afib evaluation. May have ablation, undergoing work up.   Does have 2 lesions in her brain. Has had speech issues. Follows with neurology and oncology. To have MRI brain June 2025. Had gone to [facility] December 2024 for the speech issues. Does have history of breast cancer. Remains on letrazole.     Denies NSAID use.  BP at home well controlled.  DM - blood sugars 126 this am, doing ok  Tries to stay well hydrated.    Review of Systems    Constitutional:  Negative for chills and fever.   HENT:  Negative for sore throat and trouble swallowing.    Eyes:  Negative for visual disturbance.   Respiratory:  Negative for cough and shortness of breath.    Cardiovascular:  Negative for chest pain and leg swelling.   Gastrointestinal:  Negative for abdominal pain, constipation, diarrhea, nausea and vomiting.   Endocrine: Negative for polyuria.   Genitourinary:  Negative for difficulty urinating, dysuria and hematuria.   Musculoskeletal:  Negative for back pain and neck pain.   Skin:  Negative for rash.   Neurological:  Positive for speech difficulty, weakness and numbness (feet at night). Negative for dizziness and light-headedness.   Psychiatric/Behavioral:  The patient is not nervous/anxious.        OBJECTIVE:  /68 (BP Location: Left arm, Patient Position: Sitting, Cuff Size: Adult)   Pulse 63   Wt 92.5 kg (204 lb)   SpO2 98%   BMI 33.95 kg/m²  Body mass index is 33.95 kg/m².    Physical exam:  Physical Exam  Vitals and nursing note reviewed.   Constitutional:       General: She is not in acute distress.     Appearance: Normal appearance. She is well-developed. She is not diaphoretic.      Comments: In wheelchair   HENT:      Head: Normocephalic and atraumatic.      Nose: Nose normal.      Mouth/Throat:      Mouth: Mucous membranes are moist.      Pharynx: No oropharyngeal exudate.   Eyes:      General: No scleral icterus.        Right eye: No discharge.         Left eye: No discharge.      Comments: eyeglasses   Neck:      Thyroid: No thyromegaly.   Cardiovascular:      Rate and Rhythm: Normal rate. Rhythm irregular.      Heart sounds: No murmur heard.  Pulmonary:      Effort: Pulmonary effort is normal. No respiratory distress.      Breath sounds: Normal breath sounds. No wheezing.   Abdominal:      General: Bowel sounds are normal. There is no distension.      Palpations: Abdomen is soft.   Musculoskeletal:         General: Swelling (trace b/l  ankles) present.      Cervical back: Normal range of motion and neck supple.   Skin:     General: Skin is warm and dry.      Coloration: Skin is not jaundiced.      Findings: No rash.   Neurological:      Mental Status: She is alert.      Motor: No abnormal muscle tone.      Comments: Awake, slight hesitation with speech   Psychiatric:         Mood and Affect: Mood normal.         Behavior: Behavior normal.         Medications:    Current Outpatient Medications:     amLODIPine (Norvasc) 5 mg tablet, , Disp: , Rfl:     apixaban (Eliquis) 5 mg, Take 1 tablet (5 mg total) by mouth 2 (two) times a day, Disp: , Rfl:     bimatoprost (LUMIGAN) 0.01 % ophthalmic drops, Administer 1 drop to both eyes daily at bedtime, Disp: , Rfl:     cholecalciferol (VITAMIN D3) 1,000 units tablet, Take 2 tablets (2,000 Units total) by mouth daily, Disp: , Rfl:     clobetasol (TEMOVATE) 0.05 % cream, APPLY THIN COAT TO AFFECTED AREA TWICE A DAY, Disp: , Rfl:     ezetimibe (ZETIA) 10 mg tablet, , Disp: , Rfl:     famotidine (PEPCID) 20 mg tablet, Take 1 tablet by mouth daily as needed  , Disp: , Rfl:     letrozole (FEMARA) 2.5 mg tablet, Take 2.5 mg by mouth daily, Disp: , Rfl:     LORazepam (ATIVAN) 0.5 mg tablet, Take 1 tablet by mouth daily as needed, Disp: , Rfl:     losartan (COZAAR) 25 mg tablet, Take 1 tablet (25 mg total) by mouth daily (Patient taking differently: Take 50 mg by mouth daily), Disp: , Rfl:     metoprolol succinate (TOPROL-XL) 50 mg 24 hr tablet, Take 50 mg by mouth 2 (two) times a day, Disp: , Rfl:     vitamin B-12 (VITAMIN B-12) 1,000 mcg tablet, Take 1 tablet (1,000 mcg total) by mouth daily, Disp: , Rfl:     anastrozole (ARIMIDEX) 1 mg tablet, Take 1 mg by mouth daily, Disp: , Rfl:     Allergies:  Allergies as of 04/09/2025 - Reviewed 04/09/2025   Allergen Reaction Noted    Codeine Other (See Comments)     Erythromycin  05/02/2017    Miconazole  05/02/2017       The following portions of the patient's history were  "reviewed and updated as appropriate: past family history, past surgical history and problem list.    Laboratory Results:  Lab Results   Component Value Date    SODIUM 141 04/07/2025    K 3.9 04/07/2025     04/07/2025    CO2 26 04/07/2025    BUN 12 04/07/2025    CREATININE 1.37 (H) 04/07/2025    GLUC 144 (H) 04/07/2025    CALCIUM 9.0 04/07/2025        Lab Results   Component Value Date    PTH 86.6 01/17/2022    CALCIUM 9.0 04/07/2025    PHOS 3 02/20/2025       Portions of the record may have been created with voice recognition software.  Occasional wrong word or \"sound a like\" substitutions may have occurred due to the inherent limitations of voice recognition software.  Read the chart carefully and recognize, using context, where substitutions have occurred.  "

## 2025-08-01 LAB
ALBUMIN/CREAT UR: ABNORMAL
CREAT UR-MCNC: 39.8 MG/DL (ref 50–200)
CREAT UR-MCNC: 40.2 MG/DL (ref 50–200)
GLUCOSE UR QL STRIP: NEGATIVE MG/DL
HGB UR QL STRIP: NEGATIVE MG/DL
KETONES UR QL STRIP: NEGATIVE MG/DL
LEUKOCYTE ESTERASE UR QL STRIP: NEGATIVE /UL
MICROALBUMIN UR-MCNC: <0.7 MG/DL
MUCOUS THREADS URNS QL MICRO: NORMAL
NITRITE UR QL STRIP: NEGATIVE
PH UR: 5.5 [PH] (ref 4.5–8)
PROT 24H UR-MRATE: NEGATIVE MG/DL
PROT UR-MCNC: <4 MG/DL
PROT/CREAT UR: ABNORMAL
RBC #/AREA URNS HPF: 0 /HPF (ref 0–2)
SL AMB POCT URINE COMMENT: NORMAL
SP GR UR: 1 (ref 1–1.03)
SQUAMOUS #/AREA URNS HPF: 3 /LPF (ref 0–5)
WBC #/AREA URNS HPF: 0 /HPF (ref 0–5)